# Patient Record
Sex: MALE | Race: WHITE | Employment: FULL TIME | URBAN - METROPOLITAN AREA
[De-identification: names, ages, dates, MRNs, and addresses within clinical notes are randomized per-mention and may not be internally consistent; named-entity substitution may affect disease eponyms.]

---

## 2017-04-02 DIAGNOSIS — E11.42 TYPE 2 DIABETES MELLITUS WITH DIABETIC POLYNEUROPATHY (HCC): ICD-10-CM

## 2017-04-03 RX ORDER — SAXAGLIPTIN AND METFORMIN HYDROCHLORIDE 2.5; 1 MG/1; MG/1
TABLET, FILM COATED, EXTENDED RELEASE ORAL
Qty: 60 TABLET | Refills: 0 | Status: SHIPPED | OUTPATIENT
Start: 2017-04-03 | End: 2017-04-17 | Stop reason: SDUPTHER

## 2017-04-03 RX ORDER — EMPAGLIFLOZIN 10 MG/1
TABLET, FILM COATED ORAL
Qty: 30 TABLET | Refills: 0 | Status: SHIPPED | OUTPATIENT
Start: 2017-04-03 | End: 2017-04-17 | Stop reason: SDUPTHER

## 2017-04-03 RX ORDER — PAROXETINE 30 MG/1
TABLET, FILM COATED ORAL
Qty: 60 TABLET | Refills: 0 | Status: SHIPPED | OUTPATIENT
Start: 2017-04-03 | End: 2017-04-17 | Stop reason: SDUPTHER

## 2017-04-03 RX ORDER — FENOFIBRATE 160 MG/1
TABLET ORAL
Qty: 30 TABLET | Refills: 0 | Status: SHIPPED | OUTPATIENT
Start: 2017-04-03 | End: 2017-04-17 | Stop reason: SDUPTHER

## 2017-04-03 RX ORDER — VALSARTAN 160 MG/1
TABLET ORAL
Qty: 30 TABLET | Refills: 0 | Status: SHIPPED | OUTPATIENT
Start: 2017-04-03 | End: 2017-04-17 | Stop reason: SDUPTHER

## 2017-04-03 RX ORDER — INSULIN GLARGINE 100 [IU]/ML
INJECTION, SOLUTION SUBCUTANEOUS
Qty: 30 ML | Refills: 0 | Status: SHIPPED | OUTPATIENT
Start: 2017-04-03 | End: 2017-04-17 | Stop reason: SDUPTHER

## 2017-04-03 RX ORDER — PROPRANOLOL HCL 60 MG
CAPSULE, EXTENDED RELEASE 24HR ORAL
Qty: 30 CAPSULE | Refills: 0 | Status: SHIPPED | OUTPATIENT
Start: 2017-04-03 | End: 2017-04-17 | Stop reason: SDUPTHER

## 2017-04-17 ENCOUNTER — OFFICE VISIT (OUTPATIENT)
Dept: FAMILY MEDICINE CLINIC | Age: 57
End: 2017-04-17

## 2017-04-17 VITALS
BODY MASS INDEX: 28.84 KG/M2 | HEIGHT: 71 IN | HEART RATE: 77 BPM | WEIGHT: 206 LBS | SYSTOLIC BLOOD PRESSURE: 122 MMHG | OXYGEN SATURATION: 98 % | DIASTOLIC BLOOD PRESSURE: 76 MMHG | RESPIRATION RATE: 14 BRPM

## 2017-04-17 DIAGNOSIS — I10 ESSENTIAL HYPERTENSION: ICD-10-CM

## 2017-04-17 DIAGNOSIS — E55.9 VITAMIN D DEFICIENCY: ICD-10-CM

## 2017-04-17 DIAGNOSIS — E78.00 HYPERCHOLESTEREMIA: ICD-10-CM

## 2017-04-17 DIAGNOSIS — E11.40 TYPE 2 DIABETES MELLITUS WITH DIABETIC NEUROPATHY, UNSPECIFIED LONG TERM INSULIN USE STATUS: Primary | ICD-10-CM

## 2017-04-17 DIAGNOSIS — Z79.4 TYPE 2 DIABETES MELLITUS WITH DIABETIC POLYNEUROPATHY, WITH LONG-TERM CURRENT USE OF INSULIN (HCC): ICD-10-CM

## 2017-04-17 DIAGNOSIS — F41.9 ANXIETY: ICD-10-CM

## 2017-04-17 DIAGNOSIS — E11.42 TYPE 2 DIABETES MELLITUS WITH DIABETIC POLYNEUROPATHY, WITH LONG-TERM CURRENT USE OF INSULIN (HCC): ICD-10-CM

## 2017-04-17 LAB
A/G RATIO: 1.9 (ref 1.1–2.2)
ALBUMIN SERPL-MCNC: 4.1 G/DL (ref 3.4–5)
ALP BLD-CCNC: 89 U/L (ref 40–129)
ALT SERPL-CCNC: 33 U/L (ref 10–40)
ANION GAP SERPL CALCULATED.3IONS-SCNC: 17 MMOL/L (ref 3–16)
AST SERPL-CCNC: 21 U/L (ref 15–37)
BILIRUB SERPL-MCNC: 0.4 MG/DL (ref 0–1)
BUN BLDV-MCNC: 14 MG/DL (ref 7–20)
CALCIUM SERPL-MCNC: 9.2 MG/DL (ref 8.3–10.6)
CHLORIDE BLD-SCNC: 98 MMOL/L (ref 99–110)
CHOLESTEROL, TOTAL: 171 MG/DL (ref 0–199)
CO2: 24 MMOL/L (ref 21–32)
CREAT SERPL-MCNC: 0.9 MG/DL (ref 0.9–1.3)
CREATININE URINE: 87.8 MG/DL (ref 39–259)
GFR AFRICAN AMERICAN: >60
GFR NON-AFRICAN AMERICAN: >60
GLOBULIN: 2.2 G/DL
GLUCOSE BLD-MCNC: 184 MG/DL (ref 70–99)
HBA1C MFR BLD: 7.5 %
HDLC SERPL-MCNC: 24 MG/DL (ref 40–60)
LDL CHOLESTEROL CALCULATED: ABNORMAL MG/DL
LDL CHOLESTEROL DIRECT: 79 MG/DL
MICROALBUMIN UR-MCNC: <1.2 MG/DL
MICROALBUMIN/CREAT UR-RTO: NORMAL MG/G (ref 0–30)
POTASSIUM SERPL-SCNC: 4.6 MMOL/L (ref 3.5–5.1)
SODIUM BLD-SCNC: 139 MMOL/L (ref 136–145)
TOTAL PROTEIN: 6.3 G/DL (ref 6.4–8.2)
TRIGL SERPL-MCNC: 496 MG/DL (ref 0–150)
VITAMIN D 25-HYDROXY: 19.8 NG/ML
VLDLC SERPL CALC-MCNC: ABNORMAL MG/DL

## 2017-04-17 PROCEDURE — 99214 OFFICE O/P EST MOD 30 MIN: CPT | Performed by: FAMILY MEDICINE

## 2017-04-17 PROCEDURE — 36415 COLL VENOUS BLD VENIPUNCTURE: CPT | Performed by: FAMILY MEDICINE

## 2017-04-17 PROCEDURE — 83036 HEMOGLOBIN GLYCOSYLATED A1C: CPT | Performed by: FAMILY MEDICINE

## 2017-04-17 RX ORDER — PROPRANOLOL HCL 60 MG
CAPSULE, EXTENDED RELEASE 24HR ORAL
Qty: 30 CAPSULE | Refills: 11 | Status: SHIPPED | OUTPATIENT
Start: 2017-04-17 | End: 2017-07-17 | Stop reason: SDUPTHER

## 2017-04-17 RX ORDER — VALSARTAN 160 MG/1
TABLET ORAL
Qty: 30 TABLET | Refills: 11 | Status: SHIPPED | OUTPATIENT
Start: 2017-04-17 | End: 2017-07-17 | Stop reason: SDUPTHER

## 2017-04-17 RX ORDER — FENOFIBRATE 160 MG/1
TABLET ORAL
Qty: 30 TABLET | Refills: 11 | Status: SHIPPED | OUTPATIENT
Start: 2017-04-17 | End: 2017-07-17 | Stop reason: SDUPTHER

## 2017-04-17 RX ORDER — PAROXETINE 30 MG/1
TABLET, FILM COATED ORAL
Qty: 60 TABLET | Refills: 11 | Status: SHIPPED | OUTPATIENT
Start: 2017-04-17 | End: 2017-07-17 | Stop reason: SDUPTHER

## 2017-07-17 ENCOUNTER — OFFICE VISIT (OUTPATIENT)
Dept: FAMILY MEDICINE CLINIC | Age: 57
End: 2017-07-17

## 2017-07-17 VITALS
RESPIRATION RATE: 14 BRPM | DIASTOLIC BLOOD PRESSURE: 72 MMHG | BODY MASS INDEX: 29.62 KG/M2 | SYSTOLIC BLOOD PRESSURE: 112 MMHG | WEIGHT: 211.6 LBS | HEIGHT: 71 IN

## 2017-07-17 DIAGNOSIS — Z79.4 TYPE 2 DIABETES MELLITUS WITH DIABETIC POLYNEUROPATHY, WITH LONG-TERM CURRENT USE OF INSULIN (HCC): ICD-10-CM

## 2017-07-17 DIAGNOSIS — E78.6 LOW HDL (UNDER 40): Primary | ICD-10-CM

## 2017-07-17 DIAGNOSIS — E11.42 TYPE 2 DIABETES MELLITUS WITH DIABETIC POLYNEUROPATHY, WITH LONG-TERM CURRENT USE OF INSULIN (HCC): ICD-10-CM

## 2017-07-17 DIAGNOSIS — E78.2 MIXED HYPERLIPIDEMIA: ICD-10-CM

## 2017-07-17 DIAGNOSIS — F32.5 MAJOR DEPRESSION IN REMISSION (HCC): ICD-10-CM

## 2017-07-17 DIAGNOSIS — E11.42 DIABETIC POLYNEUROPATHY ASSOCIATED WITH TYPE 2 DIABETES MELLITUS (HCC): ICD-10-CM

## 2017-07-17 DIAGNOSIS — E55.9 VITAMIN D DEFICIENCY: ICD-10-CM

## 2017-07-17 DIAGNOSIS — K21.9 GASTROESOPHAGEAL REFLUX DISEASE, ESOPHAGITIS PRESENCE NOT SPECIFIED: ICD-10-CM

## 2017-07-17 DIAGNOSIS — I10 ESSENTIAL HYPERTENSION: ICD-10-CM

## 2017-07-17 DIAGNOSIS — F41.1 GENERALIZED ANXIETY DISORDER: ICD-10-CM

## 2017-07-17 LAB — HBA1C MFR BLD: 7.4 %

## 2017-07-17 PROCEDURE — 83036 HEMOGLOBIN GLYCOSYLATED A1C: CPT | Performed by: FAMILY MEDICINE

## 2017-07-17 PROCEDURE — 99214 OFFICE O/P EST MOD 30 MIN: CPT | Performed by: FAMILY MEDICINE

## 2017-07-17 RX ORDER — PROPRANOLOL HCL 60 MG
CAPSULE, EXTENDED RELEASE 24HR ORAL
Qty: 30 CAPSULE | Refills: 11 | Status: SHIPPED | OUTPATIENT
Start: 2017-07-17 | End: 2018-01-04 | Stop reason: SDUPTHER

## 2017-07-17 RX ORDER — VALSARTAN 160 MG/1
TABLET ORAL
Qty: 30 TABLET | Refills: 11 | Status: SHIPPED | OUTPATIENT
Start: 2017-07-17 | End: 2018-01-04 | Stop reason: SDUPTHER

## 2017-07-17 RX ORDER — FENOFIBRATE 160 MG/1
TABLET ORAL
Qty: 30 TABLET | Refills: 11 | Status: SHIPPED | OUTPATIENT
Start: 2017-07-17 | End: 2018-04-03 | Stop reason: SDUPTHER

## 2017-07-17 RX ORDER — PAROXETINE 30 MG/1
TABLET, FILM COATED ORAL
Qty: 60 TABLET | Refills: 11 | Status: SHIPPED | OUTPATIENT
Start: 2017-07-17 | End: 2018-04-03 | Stop reason: SDUPTHER

## 2017-07-17 ASSESSMENT — PATIENT HEALTH QUESTIONNAIRE - PHQ9
2. FEELING DOWN, DEPRESSED OR HOPELESS: 0
1. LITTLE INTEREST OR PLEASURE IN DOING THINGS: 0
SUM OF ALL RESPONSES TO PHQ9 QUESTIONS 1 & 2: 0
SUM OF ALL RESPONSES TO PHQ QUESTIONS 1-9: 0

## 2017-09-12 RX ORDER — PEN NEEDLE, DIABETIC 31 GX5/16"
NEEDLE, DISPOSABLE MISCELLANEOUS
Qty: 100 EACH | Refills: 3 | Status: SHIPPED | OUTPATIENT
Start: 2017-09-12 | End: 2017-09-22 | Stop reason: ALTCHOICE

## 2017-09-22 ENCOUNTER — OFFICE VISIT (OUTPATIENT)
Dept: FAMILY MEDICINE CLINIC | Age: 57
End: 2017-09-22

## 2017-09-22 VITALS
OXYGEN SATURATION: 99 % | DIASTOLIC BLOOD PRESSURE: 78 MMHG | WEIGHT: 196 LBS | HEART RATE: 98 BPM | RESPIRATION RATE: 14 BRPM | SYSTOLIC BLOOD PRESSURE: 120 MMHG | BODY MASS INDEX: 27.44 KG/M2 | HEIGHT: 71 IN

## 2017-09-22 DIAGNOSIS — R20.0 NUMBNESS AND TINGLING IN BOTH HANDS: ICD-10-CM

## 2017-09-22 DIAGNOSIS — R20.2 NUMBNESS AND TINGLING IN BOTH HANDS: ICD-10-CM

## 2017-09-22 DIAGNOSIS — R19.7 DIARRHEA, UNSPECIFIED TYPE: Primary | ICD-10-CM

## 2017-09-22 DIAGNOSIS — R11.2 NAUSEA AND VOMITING, INTRACTABILITY OF VOMITING NOT SPECIFIED, UNSPECIFIED VOMITING TYPE: ICD-10-CM

## 2017-09-22 DIAGNOSIS — R19.5 STOOL COLOR ABNORMAL: ICD-10-CM

## 2017-09-22 LAB
A/G RATIO: 1.9 (ref 1.1–2.2)
ALBUMIN SERPL-MCNC: 4.5 G/DL (ref 3.4–5)
ALP BLD-CCNC: 82 U/L (ref 40–129)
ALT SERPL-CCNC: 35 U/L (ref 10–40)
ANION GAP SERPL CALCULATED.3IONS-SCNC: 18 MMOL/L (ref 3–16)
AST SERPL-CCNC: 24 U/L (ref 15–37)
BASOPHILS ABSOLUTE: 0.1 K/UL (ref 0–0.2)
BASOPHILS RELATIVE PERCENT: 0.6 %
BILIRUB SERPL-MCNC: 0.4 MG/DL (ref 0–1)
BUN BLDV-MCNC: 14 MG/DL (ref 7–20)
CALCIUM SERPL-MCNC: 9.5 MG/DL (ref 8.3–10.6)
CHLORIDE BLD-SCNC: 101 MMOL/L (ref 99–110)
CO2: 21 MMOL/L (ref 21–32)
CREAT SERPL-MCNC: 0.8 MG/DL (ref 0.9–1.3)
EOSINOPHILS ABSOLUTE: 0.2 K/UL (ref 0–0.6)
EOSINOPHILS RELATIVE PERCENT: 2.1 %
GFR AFRICAN AMERICAN: >60
GFR NON-AFRICAN AMERICAN: >60
GLOBULIN: 2.4 G/DL
GLUCOSE BLD-MCNC: 166 MG/DL (ref 70–99)
HCT VFR BLD CALC: 54 % (ref 40.5–52.5)
HEMOGLOBIN: 18.2 G/DL (ref 13.5–17.5)
LIPASE: 30 U/L (ref 13–60)
LYMPHOCYTES ABSOLUTE: 2.4 K/UL (ref 1–5.1)
LYMPHOCYTES RELATIVE PERCENT: 25.7 %
MCH RBC QN AUTO: 28.2 PG (ref 26–34)
MCHC RBC AUTO-ENTMCNC: 33.7 G/DL (ref 31–36)
MCV RBC AUTO: 83.5 FL (ref 80–100)
MONOCYTES ABSOLUTE: 0.9 K/UL (ref 0–1.3)
MONOCYTES RELATIVE PERCENT: 9.5 %
NEUTROPHILS ABSOLUTE: 5.8 K/UL (ref 1.7–7.7)
NEUTROPHILS RELATIVE PERCENT: 62.1 %
PDW BLD-RTO: 13.8 % (ref 12.4–15.4)
PLATELET # BLD: 211 K/UL (ref 135–450)
PMV BLD AUTO: 9.5 FL (ref 5–10.5)
POTASSIUM SERPL-SCNC: 3.9 MMOL/L (ref 3.5–5.1)
RBC # BLD: 6.47 M/UL (ref 4.2–5.9)
SODIUM BLD-SCNC: 140 MMOL/L (ref 136–145)
TOTAL PROTEIN: 6.9 G/DL (ref 6.4–8.2)
WBC # BLD: 9.3 K/UL (ref 4–11)

## 2017-09-22 PROCEDURE — 99213 OFFICE O/P EST LOW 20 MIN: CPT | Performed by: FAMILY MEDICINE

## 2017-09-22 PROCEDURE — 36415 COLL VENOUS BLD VENIPUNCTURE: CPT | Performed by: FAMILY MEDICINE

## 2017-09-22 PROCEDURE — 96372 THER/PROPH/DIAG INJ SC/IM: CPT | Performed by: FAMILY MEDICINE

## 2017-09-22 RX ORDER — ONDANSETRON 4 MG/1
4 TABLET, ORALLY DISINTEGRATING ORAL EVERY 8 HOURS PRN
Qty: 12 TABLET | Refills: 1 | Status: SHIPPED | OUTPATIENT
Start: 2017-09-22 | End: 2018-08-02 | Stop reason: ALTCHOICE

## 2017-09-22 RX ORDER — PROMETHAZINE HYDROCHLORIDE 25 MG/ML
25 INJECTION, SOLUTION INTRAMUSCULAR; INTRAVENOUS ONCE
Status: COMPLETED | OUTPATIENT
Start: 2017-09-22 | End: 2017-09-22

## 2017-09-22 RX ORDER — DICYCLOMINE HCL 20 MG
20 TABLET ORAL EVERY 6 HOURS
Qty: 30 TABLET | Refills: 1 | Status: SHIPPED | OUTPATIENT
Start: 2017-09-22 | End: 2018-08-02 | Stop reason: ALTCHOICE

## 2017-09-22 RX ADMIN — PROMETHAZINE HYDROCHLORIDE 25 MG: 25 INJECTION, SOLUTION INTRAMUSCULAR; INTRAVENOUS at 17:01

## 2017-09-28 DIAGNOSIS — D75.1 POLYCYTHEMIA: Primary | ICD-10-CM

## 2017-10-27 ENCOUNTER — OFFICE VISIT (OUTPATIENT)
Dept: FAMILY MEDICINE CLINIC | Age: 57
End: 2017-10-27

## 2017-10-27 VITALS
WEIGHT: 200.4 LBS | DIASTOLIC BLOOD PRESSURE: 76 MMHG | BODY MASS INDEX: 28.06 KG/M2 | HEIGHT: 71 IN | SYSTOLIC BLOOD PRESSURE: 122 MMHG | HEART RATE: 64 BPM | RESPIRATION RATE: 12 BRPM

## 2017-10-27 DIAGNOSIS — K21.9 GASTROESOPHAGEAL REFLUX DISEASE, ESOPHAGITIS PRESENCE NOT SPECIFIED: ICD-10-CM

## 2017-10-27 DIAGNOSIS — I10 ESSENTIAL HYPERTENSION, BENIGN: ICD-10-CM

## 2017-10-27 DIAGNOSIS — E78.2 MIXED HYPERLIPIDEMIA: ICD-10-CM

## 2017-10-27 DIAGNOSIS — E11.40 TYPE 2 DIABETES MELLITUS WITH DIABETIC NEUROPATHY, UNSPECIFIED LONG TERM INSULIN USE STATUS: Primary | ICD-10-CM

## 2017-10-27 DIAGNOSIS — R11.2 NAUSEA VOMITING AND DIARRHEA: ICD-10-CM

## 2017-10-27 DIAGNOSIS — Z23 NEED FOR INFLUENZA VACCINATION: ICD-10-CM

## 2017-10-27 DIAGNOSIS — R19.7 NAUSEA VOMITING AND DIARRHEA: ICD-10-CM

## 2017-10-27 DIAGNOSIS — E11.42 DIABETIC POLYNEUROPATHY ASSOCIATED WITH TYPE 2 DIABETES MELLITUS (HCC): ICD-10-CM

## 2017-10-27 LAB — HBA1C MFR BLD: 6.3 %

## 2017-10-27 PROCEDURE — 90471 IMMUNIZATION ADMIN: CPT | Performed by: FAMILY MEDICINE

## 2017-10-27 PROCEDURE — 83036 HEMOGLOBIN GLYCOSYLATED A1C: CPT | Performed by: FAMILY MEDICINE

## 2017-10-27 PROCEDURE — 99214 OFFICE O/P EST MOD 30 MIN: CPT | Performed by: FAMILY MEDICINE

## 2017-10-27 PROCEDURE — 90630 INFLUENZA, QUADV, 18-64 YRS, ID, PF, MICRO INJ, 0.1ML (FLUZONE QUADV, PF): CPT | Performed by: FAMILY MEDICINE

## 2017-10-27 NOTE — PROGRESS NOTES
Subjective:      Patient ID: Rosangela Jain is a 56 y.o. male. HPI  Chief Complaint   Patient presents with    Diabetes     DM ROUTINE FOLLOW UP AIC TODAY GOAL AND [facility] UTD      BP Readings from Last 3 Encounters:   10/27/17 122/76   09/22/17 120/78   07/17/17 112/72     Pulse Readings from Last 3 Encounters:   10/27/17 64   09/22/17 98   04/17/17 77     Wt Readings from Last 3 Encounters:   10/27/17 200 lb 6.4 oz (90.9 kg)   09/22/17 196 lb (88.9 kg)   07/17/17 211 lb 9.6 oz (96 kg)     No further n/v/d issues since last month - gets periodic episodes of gi distress - been moving ok recently  Doing much better overall  a1c 7.4% 3 months ago. Changed few things in diet - rajwinder pop intake cut back  Was up to 4-5 pops/ day  Has habit of eating in mid of night. Am bs running lower  Drinks flavored water - not water fan.  utd on eye exam  Review of Systems  Hoping to lose weight  Neuropathy sx seem better w/ lower bs  Less tingling/ numbness  No cp/palp/sob  Objective:   Physical Exam   Constitutional: He appears well-developed. No distress. HENT:   Mouth/Throat: Oropharynx is clear and moist.   Eyes: Conjunctivae are normal. No scleral icterus. Cardiovascular: Normal rate, regular rhythm, normal heart sounds and intact distal pulses. Exam reveals no gallop. No murmur heard. Pulmonary/Chest: Effort normal and breath sounds normal. No respiratory distress. He has no wheezes. He has no rhonchi. He has no rales. Abdominal: Soft. Bowel sounds are normal. He exhibits no distension. There is no tenderness. Musculoskeletal: He exhibits no edema. Neurological: He is alert. Skin: Skin is intact. No rash noted. No erythema. Psychiatric: He has a normal mood and affect. Assessment:      1. Type 2 diabetes mellitus with diabetic neuropathy, unspecified long term insulin use status ([facility] [address]     2. Need for influenza vaccination     3. Nausea vomiting and diarrhea     4. Mixed hyperlipidemia     5.  Essential

## 2018-01-04 ENCOUNTER — TELEPHONE (OUTPATIENT)
Dept: FAMILY MEDICINE CLINIC | Age: 58
End: 2018-01-04

## 2018-01-04 RX ORDER — VALSARTAN 160 MG/1
TABLET ORAL
Qty: 30 TABLET | Refills: 11 | Status: SHIPPED | OUTPATIENT
Start: 2018-01-04 | End: 2018-02-19 | Stop reason: SDUPTHER

## 2018-01-04 RX ORDER — PROPRANOLOL HCL 60 MG
CAPSULE, EXTENDED RELEASE 24HR ORAL
Qty: 30 CAPSULE | Refills: 11 | Status: SHIPPED | OUTPATIENT
Start: 2018-01-04 | End: 2018-02-19 | Stop reason: SDUPTHER

## 2018-01-04 NOTE — TELEPHONE ENCOUNTER
PT NEEDING A REFILL ON THESE SCRIPTS --  HE WOULD LIKE THEM WRITTEN OUT - SO HE CAN CHECK PRICES HE HAS LOST HIS INSURANCE      valsartan (DIOVAN) 160 MG tablet       propranolol (INDERAL LA) 60 MG extended release capsule       PT @  [phone]      ALSO WANTING TO KNOW IF THE JARDIANCE COULD BE CHANGED TO SOMETHING CHEAPER    ALSO ANY WAY TO CHANGE HIS BP MEDS. TO SOMETHING CHEAPER --       HE HAS TRIED THE GOOD RX CARD - NOT MUCH HELP.

## 2018-01-10 ENCOUNTER — TELEPHONE (OUTPATIENT)
Dept: FAMILY MEDICINE CLINIC | Age: 58
End: 2018-01-10

## 2018-01-10 NOTE — TELEPHONE ENCOUNTER
PT WOULD LIKE THE METFORMIN SENT TO [facility] [id number] - [place], [address] [facility] [phone] - f [phone]

## 2018-01-10 NOTE — TELEPHONE ENCOUNTER
PATIENT CALLED BACK AND SPOKE TO LAY IN THE FRONT OFFICE AND WAS ADVISED ON DR. Roberto Osgood NOTE.  SC

## 2018-02-19 RX ORDER — PROPRANOLOL HCL 60 MG
CAPSULE, EXTENDED RELEASE 24HR ORAL
Qty: 90 CAPSULE | Refills: 3 | Status: SHIPPED | OUTPATIENT
Start: 2018-02-19 | End: 2019-04-03 | Stop reason: SDUPTHER

## 2018-02-19 RX ORDER — VALSARTAN 160 MG/1
TABLET ORAL
Qty: 90 TABLET | Refills: 2 | Status: SHIPPED | OUTPATIENT
Start: 2018-02-19 | End: 2018-08-16

## 2018-02-19 NOTE — TELEPHONE ENCOUNTER
Patient now has to have his prescriptions filled through [facility] no and should be 90 day supply

## 2018-04-02 DIAGNOSIS — F41.1 GENERALIZED ANXIETY DISORDER: Primary | ICD-10-CM

## 2018-04-02 DIAGNOSIS — E11.9 TYPE 2 DIABETES MELLITUS WITHOUT COMPLICATION, WITHOUT LONG-TERM CURRENT USE OF INSULIN (HCC): ICD-10-CM

## 2018-04-02 DIAGNOSIS — E78.00 PURE HYPERCHOLESTEROLEMIA: ICD-10-CM

## 2018-04-03 RX ORDER — FENOFIBRATE 160 MG/1
TABLET ORAL
Qty: 90 TABLET | Refills: 0 | Status: SHIPPED | OUTPATIENT
Start: 2018-04-03 | End: 2018-08-02 | Stop reason: SDUPTHER

## 2018-04-03 RX ORDER — PAROXETINE 30 MG/1
TABLET, FILM COATED ORAL
Qty: 180 TABLET | Refills: 0 | Status: SHIPPED | OUTPATIENT
Start: 2018-04-03 | End: 2018-08-02 | Stop reason: SDUPTHER

## 2018-04-05 ENCOUNTER — TELEPHONE (OUTPATIENT)
Dept: FAMILY MEDICINE CLINIC | Age: 58
End: 2018-04-05

## 2018-05-07 ENCOUNTER — OFFICE VISIT (OUTPATIENT)
Dept: FAMILY MEDICINE CLINIC | Age: 58
End: 2018-05-07

## 2018-05-07 VITALS
HEART RATE: 82 BPM | WEIGHT: 210 LBS | SYSTOLIC BLOOD PRESSURE: 128 MMHG | HEIGHT: 71 IN | RESPIRATION RATE: 16 BRPM | BODY MASS INDEX: 29.4 KG/M2 | DIASTOLIC BLOOD PRESSURE: 78 MMHG

## 2018-05-07 DIAGNOSIS — I10 ESSENTIAL HYPERTENSION, BENIGN: ICD-10-CM

## 2018-05-07 DIAGNOSIS — F41.1 GENERALIZED ANXIETY DISORDER: ICD-10-CM

## 2018-05-07 DIAGNOSIS — E78.2 MIXED HYPERLIPIDEMIA: ICD-10-CM

## 2018-05-07 LAB
CREATININE URINE: 122.6 MG/DL (ref 39–259)
HBA1C MFR BLD: 8.9 %
MICROALBUMIN UR-MCNC: 7.4 MG/DL
MICROALBUMIN/CREAT UR-RTO: 60.4 MG/G (ref 0–30)

## 2018-05-07 PROCEDURE — 99214 OFFICE O/P EST MOD 30 MIN: CPT | Performed by: FAMILY MEDICINE

## 2018-05-07 PROCEDURE — 83036 HEMOGLOBIN GLYCOSYLATED A1C: CPT | Performed by: FAMILY MEDICINE

## 2018-05-07 RX ORDER — OMEPRAZOLE 20 MG/1
20 CAPSULE, DELAYED RELEASE ORAL DAILY
Qty: 90 CAPSULE | Refills: 3 | Status: SHIPPED | OUTPATIENT
Start: 2018-05-07 | End: 2019-04-03 | Stop reason: SDUPTHER

## 2018-08-02 ENCOUNTER — OFFICE VISIT (OUTPATIENT)
Dept: FAMILY MEDICINE CLINIC | Age: 58
End: 2018-08-02

## 2018-08-02 VITALS
DIASTOLIC BLOOD PRESSURE: 84 MMHG | HEIGHT: 71 IN | RESPIRATION RATE: 21 BRPM | HEART RATE: 83 BPM | BODY MASS INDEX: 28.9 KG/M2 | SYSTOLIC BLOOD PRESSURE: 126 MMHG | OXYGEN SATURATION: 97 % | WEIGHT: 206.4 LBS

## 2018-08-02 DIAGNOSIS — K21.9 GASTROESOPHAGEAL REFLUX DISEASE, ESOPHAGITIS PRESENCE NOT SPECIFIED: ICD-10-CM

## 2018-08-02 DIAGNOSIS — E78.00 PURE HYPERCHOLESTEROLEMIA: ICD-10-CM

## 2018-08-02 DIAGNOSIS — F41.1 GENERALIZED ANXIETY DISORDER: ICD-10-CM

## 2018-08-02 DIAGNOSIS — E11.9 TYPE 2 DIABETES MELLITUS WITHOUT COMPLICATION, WITHOUT LONG-TERM CURRENT USE OF INSULIN (HCC): ICD-10-CM

## 2018-08-02 DIAGNOSIS — E11.42 DIABETIC POLYNEUROPATHY ASSOCIATED WITH TYPE 2 DIABETES MELLITUS (HCC): Primary | ICD-10-CM

## 2018-08-02 DIAGNOSIS — Z12.5 PROSTATE CANCER SCREENING: ICD-10-CM

## 2018-08-02 DIAGNOSIS — Z11.59 ENCOUNTER FOR HEPATITIS C SCREENING TEST FOR LOW RISK PATIENT: ICD-10-CM

## 2018-08-02 LAB
A/G RATIO: 2 (ref 1.1–2.2)
ALBUMIN SERPL-MCNC: 4.3 G/DL (ref 3.4–5)
ALP BLD-CCNC: 78 U/L (ref 40–129)
ALT SERPL-CCNC: 44 U/L (ref 10–40)
ANION GAP SERPL CALCULATED.3IONS-SCNC: 13 MMOL/L (ref 3–16)
AST SERPL-CCNC: 28 U/L (ref 15–37)
BILIRUB SERPL-MCNC: <0.2 MG/DL (ref 0–1)
BUN BLDV-MCNC: 11 MG/DL (ref 7–20)
CALCIUM SERPL-MCNC: 9.5 MG/DL (ref 8.3–10.6)
CHLORIDE BLD-SCNC: 101 MMOL/L (ref 99–110)
CHOLESTEROL, TOTAL: 180 MG/DL (ref 0–199)
CO2: 25 MMOL/L (ref 21–32)
CREAT SERPL-MCNC: 0.9 MG/DL (ref 0.9–1.3)
GFR AFRICAN AMERICAN: >60
GFR NON-AFRICAN AMERICAN: >60
GLOBULIN: 2.2 G/DL
GLUCOSE BLD-MCNC: 196 MG/DL (ref 70–99)
HDLC SERPL-MCNC: 32 MG/DL (ref 40–60)
HEPATITIS C ANTIBODY INTERPRETATION: NORMAL
LDL CHOLESTEROL CALCULATED: ABNORMAL MG/DL
LDL CHOLESTEROL DIRECT: 105 MG/DL
POTASSIUM SERPL-SCNC: 5.2 MMOL/L (ref 3.5–5.1)
PROSTATE SPECIFIC ANTIGEN: 0.81 NG/ML (ref 0–4)
SODIUM BLD-SCNC: 139 MMOL/L (ref 136–145)
TOTAL PROTEIN: 6.5 G/DL (ref 6.4–8.2)
TRIGL SERPL-MCNC: 402 MG/DL (ref 0–150)
VLDLC SERPL CALC-MCNC: ABNORMAL MG/DL

## 2018-08-02 PROCEDURE — 99214 OFFICE O/P EST MOD 30 MIN: CPT | Performed by: FAMILY MEDICINE

## 2018-08-02 PROCEDURE — G0444 DEPRESSION SCREEN ANNUAL: HCPCS | Performed by: FAMILY MEDICINE

## 2018-08-02 PROCEDURE — 36415 COLL VENOUS BLD VENIPUNCTURE: CPT | Performed by: FAMILY MEDICINE

## 2018-08-02 RX ORDER — LOSARTAN POTASSIUM 100 MG/1
100 TABLET ORAL DAILY
Qty: 90 TABLET | Refills: 1 | Status: SHIPPED | OUTPATIENT
Start: 2018-08-02 | End: 2019-04-03 | Stop reason: SDUPTHER

## 2018-08-02 RX ORDER — FENOFIBRATE 160 MG/1
TABLET ORAL
Qty: 90 TABLET | Refills: 3 | Status: SHIPPED | OUTPATIENT
Start: 2018-08-02 | End: 2019-04-03

## 2018-08-02 RX ORDER — PAROXETINE 30 MG/1
TABLET, FILM COATED ORAL
Qty: 180 TABLET | Refills: 3 | Status: SHIPPED | OUTPATIENT
Start: 2018-08-02 | End: 2019-04-03 | Stop reason: SDUPTHER

## 2018-08-02 ASSESSMENT — PATIENT HEALTH QUESTIONNAIRE - PHQ9
SUM OF ALL RESPONSES TO PHQ QUESTIONS 1-9: 14
8. MOVING OR SPEAKING SO SLOWLY THAT OTHER PEOPLE COULD HAVE NOTICED. OR THE OPPOSITE, BEING SO FIGETY OR RESTLESS THAT YOU HAVE BEEN MOVING AROUND A LOT MORE THAN USUAL: 0
6. FEELING BAD ABOUT YOURSELF - OR THAT YOU ARE A FAILURE OR HAVE LET YOURSELF OR YOUR FAMILY DOWN: 1
5. POOR APPETITE OR OVEREATING: 2
4. FEELING TIRED OR HAVING LITTLE ENERGY: 2
SUM OF ALL RESPONSES TO PHQ9 QUESTIONS 1 & 2: 5
2. FEELING DOWN, DEPRESSED OR HOPELESS: 2
9. THOUGHTS THAT YOU WOULD BE BETTER OFF DEAD, OR OF HURTING YOURSELF: 0
7. TROUBLE CONCENTRATING ON THINGS, SUCH AS READING THE NEWSPAPER OR WATCHING TELEVISION: 3
1. LITTLE INTEREST OR PLEASURE IN DOING THINGS: 3
3. TROUBLE FALLING OR STAYING ASLEEP: 1
10. IF YOU CHECKED OFF ANY PROBLEMS, HOW DIFFICULT HAVE THESE PROBLEMS MADE IT FOR YOU TO DO YOUR WORK, TAKE CARE OF THINGS AT HOME, OR GET ALONG WITH OTHER PEOPLE: 1

## 2018-08-02 NOTE — PROGRESS NOTES
Subjective:      Patient ID: Maegan Dominguez is a 66 y.o. male. HPI  Chief Complaint   Patient presents with    Diabetes     DM ROUTINE FOLLOW UP AIC TOO SOON TO DO TODAY GOAL TODAY     Hypertension     HTN ROUTINE FOLLOW UP GOAL TODAY PT IS ON VALSARTAN      SUGARS COMING DOWN - LAST A1C 8.9%   HANDS/ FEET FEELING BETTER - WENT THROUGH insurance/ job change and had to come off meds for time. Bumped insulin up to 80 units daily - needs refill end of month  Taking farxiga 10/ janumet 50/1000 xr 2/d  BP Readings from Last 3 Encounters:   08/02/18 126/84   05/07/18 128/78   10/27/17 122/76     Pulse Readings from Last 3 Encounters:   08/02/18 83   05/07/18 82   10/27/17 64     Wt Readings from Last 3 Encounters:   08/02/18 206 lb 6.4 oz (93.6 kg)   05/07/18 210 lb (95.3 kg)   10/27/17 200 lb 6.4 oz (90.9 kg)   sensation in feet better w/ reduction in bs  Vision stable. 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 No swelling  No urinary issues - nocturia/ weak stream    Review of Systems    Objective:   Physical Exam   Constitutional: He appears well-developed. No distress. HENT:   Mouth/Throat: Oropharynx is clear and moist.   Eyes: Conjunctivae are normal. No scleral icterus. Cardiovascular: Normal rate, regular rhythm, normal heart sounds and intact distal pulses. Exam reveals no gallop. No murmur heard. Pulmonary/Chest: Effort normal and breath sounds normal. No respiratory distress. He has no wheezes. He has no rhonchi. He has no rales. Abdominal: Soft. Bowel sounds are normal. He exhibits no distension. There is no tenderness. Musculoskeletal: He exhibits no edema. Neurological: He is alert. Skin: Skin is intact. No rash noted. No erythema. Psychiatric: He has a normal mood and affect. Assessment:       Diagnosis Orders   1.  Diabetic polyneuropathy associated with

## 2018-08-08 RX ORDER — ROSUVASTATIN CALCIUM 10 MG/1
10 TABLET, COATED ORAL NIGHTLY
Qty: 30 TABLET | Refills: 2 | Status: SHIPPED | OUTPATIENT
Start: 2018-08-08 | End: 2019-04-03 | Stop reason: SDUPTHER

## 2018-08-10 ENCOUNTER — NURSE ONLY (OUTPATIENT)
Dept: FAMILY MEDICINE CLINIC | Age: 58
End: 2018-08-10

## 2018-08-10 DIAGNOSIS — E11.42 DIABETIC POLYNEUROPATHY ASSOCIATED WITH TYPE 2 DIABETES MELLITUS (HCC): Primary | ICD-10-CM

## 2018-08-10 LAB — HBA1C MFR BLD: 8.8 %

## 2018-08-10 PROCEDURE — 83036 HEMOGLOBIN GLYCOSYLATED A1C: CPT | Performed by: FAMILY MEDICINE

## 2018-08-13 RX ORDER — PIOGLITAZONEHYDROCHLORIDE 30 MG/1
30 TABLET ORAL DAILY
Qty: 30 TABLET | Refills: 3 | Status: SHIPPED | OUTPATIENT
Start: 2018-08-13 | End: 2019-04-03 | Stop reason: SDUPTHER

## 2018-08-16 ENCOUNTER — TELEPHONE (OUTPATIENT)
Dept: FAMILY MEDICINE CLINIC | Age: 58
End: 2018-08-16

## 2018-08-16 RX ORDER — IRBESARTAN 150 MG/1
150 TABLET ORAL NIGHTLY
Qty: 90 TABLET | Refills: 0 | Status: SHIPPED | OUTPATIENT
Start: 2018-08-16 | End: 2019-04-03 | Stop reason: SDUPTHER

## 2018-08-16 NOTE — TELEPHONE ENCOUNTER
Can try change to jentadueto or kombiglyze but I would suggest checking at [place] and [employer] for coupon as this should keep copay down.

## 2018-10-16 ENCOUNTER — CARE COORDINATION (OUTPATIENT)
Dept: CARE COORDINATION | Age: 58
End: 2018-10-16

## 2018-10-16 NOTE — CARE COORDINATION
RNCC attempted to call patient today to discuss DM. LM on VM with my name and contact information. Writer will follow up with patient in two weeks if no return call received.

## 2018-11-23 ENCOUNTER — IMMUNIZATION (OUTPATIENT)
Dept: FAMILY MEDICINE CLINIC | Age: 58
End: 2018-11-23
Payer: COMMERCIAL

## 2018-11-23 VITALS — BODY MASS INDEX: 28.79 KG/M2 | HEIGHT: 71 IN

## 2018-11-23 DIAGNOSIS — Z23 NEED FOR INFLUENZA VACCINATION: Primary | ICD-10-CM

## 2018-11-23 PROCEDURE — 90686 IIV4 VACC NO PRSV 0.5 ML IM: CPT | Performed by: NURSE PRACTITIONER

## 2018-11-23 PROCEDURE — 90471 IMMUNIZATION ADMIN: CPT | Performed by: NURSE PRACTITIONER

## 2018-12-07 ENCOUNTER — CARE COORDINATION (OUTPATIENT)
Dept: CARE COORDINATION | Age: 58
End: 2018-12-07

## 2018-12-28 ENCOUNTER — CARE COORDINATION (OUTPATIENT)
Dept: CARE COORDINATION | Age: 58
End: 2018-12-28

## 2019-01-23 ENCOUNTER — CARE COORDINATION (OUTPATIENT)
Dept: CARE COORDINATION | Age: 59
End: 2019-01-23

## 2019-02-04 ENCOUNTER — CARE COORDINATION (OUTPATIENT)
Dept: CARE COORDINATION | Age: 59
End: 2019-02-04

## 2019-04-03 ENCOUNTER — OFFICE VISIT (OUTPATIENT)
Dept: FAMILY MEDICINE CLINIC | Age: 59
End: 2019-04-03
Payer: COMMERCIAL

## 2019-04-03 VITALS
DIASTOLIC BLOOD PRESSURE: 78 MMHG | HEIGHT: 71 IN | RESPIRATION RATE: 12 BRPM | WEIGHT: 189 LBS | HEART RATE: 80 BPM | SYSTOLIC BLOOD PRESSURE: 122 MMHG | BODY MASS INDEX: 26.46 KG/M2

## 2019-04-03 DIAGNOSIS — F32.5 MAJOR DEPRESSION IN REMISSION (HCC): ICD-10-CM

## 2019-04-03 DIAGNOSIS — F41.1 GENERALIZED ANXIETY DISORDER: ICD-10-CM

## 2019-04-03 DIAGNOSIS — E55.9 VITAMIN D DEFICIENCY: ICD-10-CM

## 2019-04-03 DIAGNOSIS — F41.1 ANXIETY STATE: Chronic | ICD-10-CM

## 2019-04-03 DIAGNOSIS — M54.9 UPPER BACK PAIN: ICD-10-CM

## 2019-04-03 DIAGNOSIS — E78.5 HYPERLIPIDEMIA, UNSPECIFIED HYPERLIPIDEMIA TYPE: Chronic | ICD-10-CM

## 2019-04-03 DIAGNOSIS — M79.675 PAIN OF LEFT GREAT TOE: ICD-10-CM

## 2019-04-03 DIAGNOSIS — E11.9 TYPE 2 DIABETES MELLITUS WITHOUT COMPLICATION, WITHOUT LONG-TERM CURRENT USE OF INSULIN (HCC): ICD-10-CM

## 2019-04-03 DIAGNOSIS — I10 ESSENTIAL HYPERTENSION, BENIGN: ICD-10-CM

## 2019-04-03 DIAGNOSIS — K62.5 RECTAL BLEED: ICD-10-CM

## 2019-04-03 DIAGNOSIS — R63.4 WEIGHT LOSS: ICD-10-CM

## 2019-04-03 DIAGNOSIS — E11.9 DIABETES MELLITUS TYPE 2 IN NONOBESE (HCC): Primary | ICD-10-CM

## 2019-04-03 DIAGNOSIS — R68.89 COLD INTOLERANCE: ICD-10-CM

## 2019-04-03 DIAGNOSIS — K21.9 GASTROESOPHAGEAL REFLUX DISEASE WITHOUT ESOPHAGITIS: ICD-10-CM

## 2019-04-03 DIAGNOSIS — E11.42 DIABETIC POLYNEUROPATHY ASSOCIATED WITH TYPE 2 DIABETES MELLITUS (HCC): ICD-10-CM

## 2019-04-03 DIAGNOSIS — Z12.11 COLON CANCER SCREENING: ICD-10-CM

## 2019-04-03 LAB
A/G RATIO: 2 (ref 1.1–2.2)
ALBUMIN SERPL-MCNC: 4.3 G/DL (ref 3.4–5)
ALP BLD-CCNC: 180 U/L (ref 40–129)
ALT SERPL-CCNC: 27 U/L (ref 10–40)
ANION GAP SERPL CALCULATED.3IONS-SCNC: 16 MMOL/L (ref 3–16)
AST SERPL-CCNC: 17 U/L (ref 15–37)
BASOPHILS ABSOLUTE: 0 K/UL (ref 0–0.2)
BASOPHILS RELATIVE PERCENT: 0.6 %
BILIRUB SERPL-MCNC: 0.3 MG/DL (ref 0–1)
BUN BLDV-MCNC: 13 MG/DL (ref 7–20)
CALCIUM SERPL-MCNC: 9.3 MG/DL (ref 8.3–10.6)
CHLORIDE BLD-SCNC: 91 MMOL/L (ref 99–110)
CO2: 25 MMOL/L (ref 21–32)
CREAT SERPL-MCNC: 0.7 MG/DL (ref 0.9–1.3)
EOSINOPHILS ABSOLUTE: 0.1 K/UL (ref 0–0.6)
EOSINOPHILS RELATIVE PERCENT: 1 %
GFR AFRICAN AMERICAN: >60
GFR NON-AFRICAN AMERICAN: >60
GLOBULIN: 2.2 G/DL
GLUCOSE BLD-MCNC: 492 MG/DL (ref 70–99)
HCT VFR BLD CALC: 44.5 % (ref 40.5–52.5)
HEMOGLOBIN: 15.3 G/DL (ref 13.5–17.5)
LYMPHOCYTES ABSOLUTE: 2 K/UL (ref 1–5.1)
LYMPHOCYTES RELATIVE PERCENT: 37.4 %
MCH RBC QN AUTO: 28.5 PG (ref 26–34)
MCHC RBC AUTO-ENTMCNC: 34.4 G/DL (ref 31–36)
MCV RBC AUTO: 83 FL (ref 80–100)
MONOCYTES ABSOLUTE: 0.5 K/UL (ref 0–1.3)
MONOCYTES RELATIVE PERCENT: 9.5 %
NEUTROPHILS ABSOLUTE: 2.8 K/UL (ref 1.7–7.7)
NEUTROPHILS RELATIVE PERCENT: 51.5 %
PDW BLD-RTO: 13.3 % (ref 12.4–15.4)
PLATELET # BLD: 247 K/UL (ref 135–450)
PMV BLD AUTO: 9.9 FL (ref 5–10.5)
POTASSIUM SERPL-SCNC: 4.5 MMOL/L (ref 3.5–5.1)
RBC # BLD: 5.36 M/UL (ref 4.2–5.9)
SODIUM BLD-SCNC: 132 MMOL/L (ref 136–145)
TOTAL PROTEIN: 6.5 G/DL (ref 6.4–8.2)
TSH REFLEX: 0.82 UIU/ML (ref 0.27–4.2)
URIC ACID, SERUM: 3.8 MG/DL (ref 3.5–7.2)
WBC # BLD: 5.4 K/UL (ref 4–11)

## 2019-04-03 PROCEDURE — 99214 OFFICE O/P EST MOD 30 MIN: CPT | Performed by: FAMILY MEDICINE

## 2019-04-03 RX ORDER — LOSARTAN POTASSIUM 100 MG/1
100 TABLET ORAL DAILY
Qty: 90 TABLET | Refills: 3 | Status: SHIPPED | OUTPATIENT
Start: 2019-04-03 | End: 2019-06-07

## 2019-04-03 RX ORDER — ICOSAPENT ETHYL 1000 MG/1
2 CAPSULE ORAL 2 TIMES DAILY
Qty: 360 CAPSULE | Refills: 3 | Status: SHIPPED | OUTPATIENT
Start: 2019-04-03 | End: 2020-03-25 | Stop reason: SDUPTHER

## 2019-04-03 RX ORDER — PROPRANOLOL HCL 60 MG
CAPSULE, EXTENDED RELEASE 24HR ORAL
Qty: 90 CAPSULE | Refills: 3 | Status: SHIPPED | OUTPATIENT
Start: 2019-04-03 | End: 2020-03-25 | Stop reason: SDUPTHER

## 2019-04-03 RX ORDER — IRBESARTAN 150 MG/1
150 TABLET ORAL NIGHTLY
Qty: 90 TABLET | Refills: 3 | Status: SHIPPED | OUTPATIENT
Start: 2019-04-03 | End: 2020-03-25 | Stop reason: SDUPTHER

## 2019-04-03 RX ORDER — PAROXETINE 30 MG/1
TABLET, FILM COATED ORAL
Qty: 180 TABLET | Refills: 3 | Status: SHIPPED | OUTPATIENT
Start: 2019-04-03 | End: 2020-03-25 | Stop reason: SDUPTHER

## 2019-04-03 RX ORDER — PIOGLITAZONEHYDROCHLORIDE 30 MG/1
30 TABLET ORAL DAILY
Qty: 90 TABLET | Refills: 3 | Status: SHIPPED | OUTPATIENT
Start: 2019-04-03 | End: 2020-03-25 | Stop reason: SDUPTHER

## 2019-04-03 RX ORDER — ROSUVASTATIN CALCIUM 10 MG/1
10 TABLET, COATED ORAL NIGHTLY
Qty: 90 TABLET | Refills: 3 | Status: SHIPPED | OUTPATIENT
Start: 2019-04-03 | End: 2020-03-25 | Stop reason: SDUPTHER

## 2019-04-03 RX ORDER — OMEPRAZOLE 20 MG/1
20 CAPSULE, DELAYED RELEASE ORAL DAILY
Qty: 90 CAPSULE | Refills: 3 | Status: SHIPPED | OUTPATIENT
Start: 2019-04-03 | End: 2020-03-25 | Stop reason: SDUPTHER

## 2019-04-03 NOTE — PROGRESS NOTES
Subjective:      Patient ID: Egdar Winters is a 66 y.o. male. HPI  Chief Complaint   Patient presents with    Diabetes     6 MO DM ROUTINE FOLLOW UP AIC NEEDED DISCUSS EYE AND COLONOSCOPY     Other     HAVING A LOT OF STRANGE FEELINGS IN FEET, IT IS NOT REALLY PAIN BUT THROBS?  HARD TO WALK AT TIMES IF HE DOES NOT HAVE ANYTHING ON HIS FEET IT FEELS STRANGE LIKE SOMETHING HAS TO COVER HIS FEET UP     Other     WANTS TO TALK TO [address] [place] [address] ABOUT ANOTHER COLONOSCOPY HE FEELS HE NEEDS TO REPEAT IT BUT WILL DISCUSS WITH DRBERTRAND      BP Readings from Last 3 Encounters:   04/03/19 122/78   08/02/18 126/84   05/07/18 128/78     Pulse Readings from Last 3 Encounters:   04/03/19 80   08/02/18 83   05/07/18 82     Wt Readings from Last 3 Encounters:   04/03/19 189 lb (85.7 kg)   08/02/18 206 lb 6.4 oz (93.6 kg)   05/07/18 210 lb (95.3 kg)     Current Outpatient Medications   Medication Sig Dispense Refill    irbesartan (AVAPRO) 150 MG tablet Take 1 tablet by mouth nightly 90 tablet 0    pioglitazone (ACTOS) 30 MG tablet Take 1 tablet by mouth daily 30 tablet 3    insulin glargine (LANTUS SOLOSTAR) 100 UNIT/ML injection pen Inject 60-80 Units into the skin nightly 45 pen 5    fenofibrate 160 MG tablet TAKE 1 TABLET BY MOUTH DAILY 90 tablet 3    PARoxetine (PAXIL) 30 MG tablet TAKE 2 TABLETS BY MOUTH DAILY 180 tablet 3    losartan (COZAAR) 100 MG tablet Take 1 tablet by mouth daily 90 tablet 1    omeprazole (PRILOSEC) 20 MG delayed release capsule Take 1 capsule by mouth daily 90 capsule 3    dapagliflozin (FARXIGA) 10 MG tablet Take 1 tablet by mouth every morning 90 tablet 1    propranolol (INDERAL LA) 60 MG extended release capsule TAKE 1 CAPSULE BY MOUTH DAILY 90 capsule 3    ONE TOUCH ULTRA TEST strip USE AS DIRECTED DAILY 50 strip 11    B-D UF III MINI PEN NEEDLES 31G X 5 MM MISC USE AS DIRECTED WITH INSULIN 100 each 0    rosuvastatin (CRESTOR) 10 MG tablet Take 1 tablet by mouth nightly 30 tablet 2    SITagliptin-metFORMIN (JANUMET XR)  MG TB24 per extended release tablet Take 2 tablets by mouth daily 180 tablet 3     No current facility-administered medications for this visit. Lab Results   Component Value Date    LABA1C 8.8 08/10/2018     Lab Results   Component Value Date    .9 04/17/2015   HAS HAD BLOOD IN STOOL OFF AND ON FOR LAST 3 WEEKS - yellowish w/ streaks of blood in stool and blood noted on toilet paper. bm's fairly regular formed but not hard - 1x/d - feels okay - no abd pain/ discomfort - just came out of blue. Left big toe pain at times - uncomfortable walking - wearing slippers around house to provide relief  Some swelling at times - no redness - varies day to day but doesn't go all way away. Had pain right shoulder blade area - ? Related to posture - tried bengay - not going away - 4-5 months  Pain seems to be expanding right upper back -doesn't keep from doing anything but there. occ sharp pain - no bruising - may lean on right - sleeps more on left side - can feel it now. Working multiple jobs. - no lifting anything - nki. No itch/ pain / swelling around anus - occ sore after wiping/ bm. 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 crestor  Hasn't used insulin for couple months - had been taking 60 units. Objective:   Physical Exam   Constitutional: He appears well-developed. No distress. HENT:   Mouth/Throat: Oropharynx is clear and moist.   Eyes: Conjunctivae are normal. No scleral icterus.    Cardiovascular: Normal rate, regular rhythm, normal heart sounds and intact distal pulses. Exam reveals no gallop. No murmur heard. Pulmonary/Chest: Effort normal and breath sounds normal. No respiratory distress. He has no wheezes. He has no rhonchi. He has no rales. Abdominal: Soft. Bowel sounds are normal. He exhibits no distension. There is no tenderness. Genitourinary:   Genitourinary Comments: Anus - no fissure - no significant inflammation or hemorrhoids   Musculoskeletal: He exhibits no edema. Mild ttp right upper parathoracic w/o palp spasm   Neurological: He is alert. 0/5 monofilament  Feet clear - no inflammation toe   Skin: Skin is intact. No rash noted. No erythema. Psychiatric: He has a normal mood and affect. Assessment:       Diagnosis Orders   1. Diabetes mellitus type 2 in nonobese ([facility])  POCT glycosylated hemoglobin (Hb A1C)     DIABETES FOOT EXAM   2. Type 2 diabetes mellitus without complication, without long-term current use of insulin ([facility])  insulin glargine (LANTUS SOLOSTAR) 100 UNIT/ML injection pen   3. Generalized anxiety disorder  PARoxetine (PAXIL) 30 MG tablet   4. Hyperlipidemia, unspecified hyperlipidemia type     5. Essential hypertension, benign     6. Major depression in remission ([address]     7. Vitamin D deficiency     8. Diabetic polyneuropathy associated with type 2 diabetes mellitus ([address]     9. Gastroesophageal reflux disease without esophagitis     10. Anxiety state     11. Rectal bleed     12. Pain of left great toe     13. Upper back pain     14. Colon cancer screening             Plan:      Labs 8/18 reviewed  Diet/ exercise d/w pt  Annual eye exam  Feet care dw/ pt - neuropathy pain likely -check uric acid - possible gout?     Refer for screening colonoscopy - mild rectal bleed likely due to internal hemorrhoids  tx empirically anusol hc but due for screen colonoscopy anyway  Mood fairly good - cont paxil 30/d for now  Check labs today  Compliance stressed - change fenofibrate to vascepa  Restart other meds and recheck 3 months.   Yuli Castillo MD

## 2019-04-04 LAB
ESTIMATED AVERAGE GLUCOSE: 366.6 MG/DL
HBA1C MFR BLD: 14.4 %

## 2019-04-09 ENCOUNTER — TELEPHONE (OUTPATIENT)
Dept: FAMILY MEDICINE CLINIC | Age: 59
End: 2019-04-09

## 2019-04-09 NOTE — TELEPHONE ENCOUNTER
The losartan was changed to irbesartan due to concerns w/ losartan contamination. Can return to losartan if no concerns.

## 2019-05-10 ENCOUNTER — CARE COORDINATION (OUTPATIENT)
Dept: CARE COORDINATION | Age: 59
End: 2019-05-10

## 2019-05-10 NOTE — CARE COORDINATION
[facility] mailed care coordination introduction letter today and will follow up with introduction phone call in 1 week.

## 2019-06-07 ENCOUNTER — OFFICE VISIT (OUTPATIENT)
Dept: FAMILY MEDICINE CLINIC | Age: 59
End: 2019-06-07
Payer: COMMERCIAL

## 2019-06-07 VITALS
HEIGHT: 71 IN | HEART RATE: 100 BPM | SYSTOLIC BLOOD PRESSURE: 120 MMHG | WEIGHT: 193.2 LBS | OXYGEN SATURATION: 96 % | BODY MASS INDEX: 27.05 KG/M2 | TEMPERATURE: 99.5 F | DIASTOLIC BLOOD PRESSURE: 74 MMHG

## 2019-06-07 DIAGNOSIS — J02.9 SORE THROAT: Primary | ICD-10-CM

## 2019-06-07 LAB — S PYO AG THROAT QL: NORMAL

## 2019-06-07 PROCEDURE — 87880 STREP A ASSAY W/OPTIC: CPT | Performed by: REGISTERED NURSE

## 2019-06-07 PROCEDURE — 99213 OFFICE O/P EST LOW 20 MIN: CPT | Performed by: REGISTERED NURSE

## 2019-06-07 RX ORDER — AMOXICILLIN AND CLAVULANATE POTASSIUM 875; 125 MG/1; MG/1
1 TABLET, FILM COATED ORAL 2 TIMES DAILY WITH MEALS
Qty: 20 TABLET | Refills: 0 | Status: SHIPPED | OUTPATIENT
Start: 2019-06-10 | End: 2019-06-20

## 2019-06-07 RX ORDER — METHYLPREDNISOLONE 4 MG/1
4 TABLET ORAL SEE ADMIN INSTRUCTIONS
Qty: 1 KIT | Refills: 0 | Status: SHIPPED | OUTPATIENT
Start: 2019-06-07 | End: 2019-06-13

## 2019-06-07 ASSESSMENT — ENCOUNTER SYMPTOMS
NAUSEA: 1
CHEST TIGHTNESS: 0
TROUBLE SWALLOWING: 0
WHEEZING: 0
ABDOMINAL DISTENTION: 0
COUGH: 1
SINUS PRESSURE: 1
CONSTIPATION: 0
ABDOMINAL PAIN: 0
SHORTNESS OF BREATH: 1
DIARRHEA: 0
SORE THROAT: 1
VOMITING: 0
SINUS PAIN: 1

## 2019-06-07 NOTE — PATIENT INSTRUCTIONS
Mucinex, Flonase, Delsym and Zyrtec  For the first 7-14 days of symptoms follow instructions below, even before being seen in the office or even during treatment with antibiotics, until symptom free. 1. Water: Drink 1 ounce of water for every 2 pounds of body weight for adults, 90 Ounces of water per day. This will loosen mucus in the head and chest & improve the weak feeling of dehydration, allow the body to get germ fighting resources to the infection. Half can be juice or sugar free Crystal Light. Don't count drinks with caffeine or carbonation. Infants can have Pedialyte liquid or freezer pops. Avoid salt if you have high Blood Pressure, swelling in the feet or ankles or have heart problems. 2. Humidity: Humidify the air to 35-50% ( or until the windows fog over slightly).  Summer use of an air conditioner turned down too far and can result in dry air. Can use a humidifier, vaporizer, boil water on the stove or put a coffee can full of water on the heater vents. This will loosen mucus from infections and allergies. 3. Sleep: Get 8-10 hours a night and rest during the evening after work or school. If you have trouble sleeping, adults can take Melatonin 5mg up to 2 tabs at bedtime ( not for children or pregnant women). If Mono is suspected then sleep during 9PM to 9AM time span (if possible.)   4. Cough: Take cough medicines with Guaifenesin ( to loosen chest or head congestion) and Dextromethorphan ( to decrease excess cough). Robitussin D.M. Syrup every 4-6 hrs or Mucinex D. M. pills twice a day. Use the pediatric formulations for children over 6 months making sure they are alcohol & sugar free for children, pregnant women, and diabetics. 5. Pain And Fevers: Take Acetaminophen ( Tylenol) for fevers, aches, and headaches. 2-500 mg every 8 hours for adults. Appropriate doses at bedtime for children may help them sleep better. If pregnant take 1 -500 mg (Tylenol) every 8 hours as needed.  Ibuprofen may be used if not pregnant, but should be given with food to avoid nausea. Avoid Ibuprofen if you have high blood pressure, CHF, or kidney problems. 6.Gargle: (DAY ONE OF SYMPTOMS) Gargle in the back of the throat with the head tilted back and to the sides with a strong mouthwash  ( Listerine or Scope) after meals and at bedtime at least 4 -5 times a day. This helps kill bacteria and viruses in the back of the throat and will shorten the duration and decrease the severity of your symptoms: sore throat, cough, ear popping,/ear pain, and possibly dizziness. 7. Smoking: Avoid smoking or exposure to second hand smoke. 8. Zinc: (DAY ONE OF SYMPTOMS)  Zinc lozenges such as Cold Luis Manuel (available most stores), or Basic ([employer] brand) will help shorten the duration and lessen symptoms such as sore throat, cough, nasal congestion, runny nose, and post nasal drip. Use 1 lozenge every 2-4 hours ( after meals if stomach is sensitive). Children can use 10-15 mg or less 3-4 times a day or Zinc lollypops. In pregnancy limit to 50-60 mg a day for 7 days as prenatals have Zinc also.    With diarrhea use zinc pills 50 mg 1/2 to 1 pill 2x/day. 9. Vitamins: Vitamin C 500 mg with breakfast and dinner. Children and pregnant women should drink citrus juices. This speeds healing and strengthens immune system. 10. Chest Symptoms: Vicks Vapor rub to the chest at bedtime. 11. Decongestants: Avoid all decongestants if you have high blood pressure. Safe to take if you do not have high blood pressure. Try all of the above starting with day 1 of symptoms. If Strep throat symptoms appear call to be seen in the office as soon as possible and don't gargle on that day. Newborns, infants, or anyone with earaches or influenza may need to be seen quickly. Adults with fevers over 103 degrees or shortness of breath should call the office immediately.       Patient Education        Sore Throat: Care Instructions  Your Care Instructions    Infection by bacteria or a virus causes most sore throats. Cigarette smoke, dry air, air pollution, allergies, and yelling can also cause a sore throat. Sore throats can be painful and annoying. Fortunately, most sore throats go away on their own. If you have a bacterial infection, your doctor may prescribe antibiotics. Follow-up care is a key part of your treatment and safety. Be sure to make and go to all appointments, and call your doctor if you are having problems. It's also a good idea to know your test results and keep a list of the medicines you take. How can you care for yourself at home? · If your doctor prescribed antibiotics, take them as directed. Do not stop taking them just because you feel better. You need to take the full course of antibiotics. · Gargle with warm salt water once an hour to help reduce swelling and relieve discomfort. Use 1 teaspoon of salt mixed in 1 cup of warm water. · Take an over-the-counter pain medicine, such as acetaminophen (Tylenol), ibuprofen (Advil, Motrin), or naproxen (Aleve). Read and follow all instructions on the label. · Be careful when taking over-the-counter cold or flu medicines and Tylenol at the same time. Many of these medicines have acetaminophen, which is Tylenol. Read the labels to make sure that you are not taking more than the recommended dose. Too much acetaminophen (Tylenol) can be harmful. · Drink plenty of fluids. Fluids may help soothe an irritated throat. Hot fluids, such as tea or soup, may help decrease throat pain. · Use over-the-counter throat lozenges to soothe pain. Regular cough drops or hard candy may also help. These should not be given to young children because of the risk of choking. · Do not smoke or allow others to smoke around you. If you need help quitting, talk to your doctor about stop-smoking programs and medicines. These can increase your chances of quitting for good. · Use a vaporizer or humidifier to add moisture to your bedroom. Follow the directions for cleaning the machine. When should you call for help? Call your doctor now or seek immediate medical care if:    · You have new or worse trouble swallowing.     · Your sore throat gets much worse on one side.    Watch closely for changes in your health, and be sure to contact your doctor if you do not get better as expected. Where can you learn more? Go to [email or web address] and sign in to your [employer] account. Enter L754 in the [employer] box to learn more about \"Sore Throat: Care Instructions. \"     If you do not have an account, please click on the \"Sign Up Now\" link. Current as of: October 21, 2018  Content Version: 12.0  © 1238-4244 [employer]. Care instructions adapted under license by [facility] ([facility]). If you have questions about a medical condition or this instruction, always ask your healthcare professional. [place] [address] any warranty or liability for your use of this information.

## 2019-06-07 NOTE — PROGRESS NOTES
Range Status    Hemoglobin A1C 04/03/2019 14.4  See comment % Final    Comment: Comment:  Diagnosis of Diabetes: > or = 6.5%  Increased risk of diabetes (Prediabetes): 5.7-6.4%  Glycemic Control: Nonpregnant Adults: <7.0%                    Pregnant: <6.0%        eAG 04/03/2019 366.6  mg/dL Final    Uric Acid, Serum 04/03/2019 3.8  3.5 - 7.2 mg/dL Final    TSH 04/03/2019 0.82  0.27 - 4.20 uIU/mL Final    Sodium 04/03/2019 132* 136 - 145 mmol/L Final    Potassium 04/03/2019 4.5  3.5 - 5.1 mmol/L Final    Comment: Specimen hemolysis has exceeded the interference as defined by Roche. Value may be falsely increased. Suggest recollection if clinically  indicated.  Chloride 04/03/2019 91* 99 - 110 mmol/L Final    CO2 04/03/2019 25  21 - 32 mmol/L Final    Anion Gap 04/03/2019 16  3 - 16 Final    Glucose 04/03/2019 492* 70 - 99 mg/dL Final    Comment: Specimen lipemia has exceeded the interference as defined by Roche. Value  may be falsely increased.  BUN 04/03/2019 13  7 - 20 mg/dL Final    Comment: Specimen lipemia has exceeded the interference as defined by Roche. Value  may be falsely decreased.  CREATININE 04/03/2019 0.7* 0.9 - 1.3 mg/dL Final    Comment: Specimen lipemia has exceeded the interference as defined by Roche. Result may be affected.  GFR Non- 04/03/2019 >60  >60 Final    Comment: >60 mL/min/1.73m2 EGFR, calc. for ages 25 and older using the  MDRD formula (not corrected for weight), is valid for stable  renal function.  GFR  04/03/2019 >60  >60 Final    Comment: Chronic Kidney Disease: less than 60 ml/min/1.73 sq.m. Kidney Failure: less than 15 ml/min/1.73 sq.m. Results valid for patients 18 years and older.       Calcium 04/03/2019 9.3  8.3 - 10.6 mg/dL Final    Total Protein 04/03/2019 6.5  6.4 - 8.2 g/dL Final    Alb 04/03/2019 4.3  3.4 - 5.0 g/dL Final    Comment: Specimen lipemia has exceeded the interference as defined 04/03/2019 0.0  0.0 - 0.2 K/uL Final       No family history on file. Current Outpatient Medications   Medication Sig Dispense Refill    irbesartan (AVAPRO) 150 MG tablet Take 1 tablet by mouth nightly 90 tablet 3    pioglitazone (ACTOS) 30 MG tablet Take 1 tablet by mouth daily 90 tablet 3    insulin glargine (LANTUS SOLOSTAR) 100 UNIT/ML injection pen Inject 60-80 Units into the skin nightly 45 pen 5    PARoxetine (PAXIL) 30 MG tablet TAKE 2 TABLETS BY MOUTH DAILY 180 tablet 3    omeprazole (PRILOSEC) 20 MG delayed release capsule Take 1 capsule by mouth daily 90 capsule 3    dapagliflozin (FARXIGA) 10 MG tablet Take 1 tablet by mouth every morning 90 tablet 3    propranolol (INDERAL LA) 60 MG extended release capsule TAKE 1 CAPSULE BY MOUTH DAILY 90 capsule 3    rosuvastatin (CRESTOR) 10 MG tablet Take 1 tablet by mouth nightly 90 tablet 3    SITagliptin-metFORMIN (JANUMET XR)  MG TB24 per extended release tablet Take 2 tablets by mouth daily 180 tablet 1    Icosapent Ethyl (VASCEPA) 1 g CAPS capsule Take 2 capsules by mouth 2 times daily 360 capsule 3    ONE TOUCH ULTRA TEST strip USE AS DIRECTED DAILY 50 strip 11    B-D UF III MINI PEN NEEDLES 31G X 5 MM MISC USE AS DIRECTED WITH INSULIN 100 each 0     No current facility-administered medications for this visit. Allergies   Allergen Reactions    Lisinopril Other (See Comments)     COUGH       Review of Systems   Constitutional: Positive for chills, fatigue and fever. Negative for diaphoresis. HENT: Positive for postnasal drip, sinus pressure, sinus pain, sneezing and sore throat. Negative for congestion, ear discharge, ear pain, hearing loss, rhinorrhea and trouble swallowing. Respiratory: Positive for cough and shortness of breath. Negative for chest tightness and wheezing. Cardiovascular: Negative for chest pain and palpitations. Gastrointestinal: Positive for nausea.  Negative for abdominal distention, abdominal pain, constipation, diarrhea and vomiting. Neurological: Negative for dizziness, weakness, light-headedness, numbness and headaches. All other systems reviewed and are negative. Vitals:  /74 (Site: Left Upper Arm, Position: Sitting, Cuff Size: Medium Adult)   Pulse 100   Temp 99.5 °F (37.5 °C) (Oral)   Ht 5' 11\" (1.803 m)   Wt 193 lb 3.2 oz (87.6 kg)   SpO2 96%   BMI 26.95 kg/m²     Physical Exam   Constitutional: He is oriented to person, place, and time. He appears well-developed and well-nourished. HENT:   Head: Normocephalic and atraumatic. Right Ear: Tympanic membrane, external ear and ear canal normal.   Left Ear: Tympanic membrane, external ear and ear canal normal.   Nose: Nose normal. Right sinus exhibits no maxillary sinus tenderness and no frontal sinus tenderness. Left sinus exhibits no maxillary sinus tenderness and no frontal sinus tenderness. Mouth/Throat: Uvula is midline, oropharynx is clear and moist and mucous membranes are normal. No oropharyngeal exudate, posterior oropharyngeal edema, posterior oropharyngeal erythema or tonsillar abscesses. No tonsillar exudate. Eyes: Pupils are equal, round, and reactive to light. Conjunctivae and EOM are normal.   Neck: Normal range of motion. Neck supple. No thyromegaly present. Cardiovascular: Normal rate, regular rhythm, normal heart sounds and intact distal pulses. Pulmonary/Chest: Effort normal and breath sounds normal.   Lymphadenopathy:     He has no cervical adenopathy. Neurological: He is alert and oriented to person, place, and time. Skin: Skin is warm and dry. Capillary refill takes less than 2 seconds. Psychiatric: He has a normal mood and affect. His behavior is normal. Judgment and thought content normal.   Nursing note and vitals reviewed. Assessment/Plan     1. Sore throat  Rapid negative. Sent for culture. Treat with steroid taper. Fever today at visit- ibuprofen.  Given paper printed Rx for Augmentin to start on Tuesday 6/11/19 if no improvement of symptoms. Given OTC management education- Mucinex, Flonase, Delsym, push fluids, throat lozenges, and Zyrtec. - POCT rapid strep A  - Throat culture; Future  - methylPREDNISolone (MEDROL, BASIA,) 4 MG tablet; Take 1 tablet by mouth See Admin Instructions for 6 days Take by mouth. Dispense: 1 kit; Refill: 0  - amoxicillin-clavulanate (AUGMENTIN) 875-125 MG per tablet; Take 1 tablet by mouth 2 times daily (with meals) for 10 days  Dispense: 20 tablet; Refill: 0  - Throat culture      Orders Placed This Encounter   Procedures    Throat culture     Standing Status:   Future     Number of Occurrences:   1     Standing Expiration Date:   6/6/2020    POCT rapid strep A       Return if symptoms worsen or fail to improve.     Balbir Harris NP    6/7/2019  4:51 PM

## 2019-06-09 LAB — THROAT CULTURE: NORMAL

## 2019-06-10 ASSESSMENT — ENCOUNTER SYMPTOMS: RHINORRHEA: 0

## 2019-07-05 ENCOUNTER — OFFICE VISIT (OUTPATIENT)
Dept: FAMILY MEDICINE CLINIC | Age: 59
End: 2019-07-05
Payer: COMMERCIAL

## 2019-07-05 VITALS
DIASTOLIC BLOOD PRESSURE: 80 MMHG | OXYGEN SATURATION: 97 % | HEIGHT: 71 IN | RESPIRATION RATE: 20 BRPM | HEART RATE: 88 BPM | SYSTOLIC BLOOD PRESSURE: 120 MMHG | BODY MASS INDEX: 26.54 KG/M2 | WEIGHT: 189.6 LBS

## 2019-07-05 DIAGNOSIS — F41.9 ANXIETY: ICD-10-CM

## 2019-07-05 DIAGNOSIS — I10 ESSENTIAL HYPERTENSION: ICD-10-CM

## 2019-07-05 DIAGNOSIS — E78.00 HYPERCHOLESTEREMIA: ICD-10-CM

## 2019-07-05 DIAGNOSIS — E11.40 TYPE 2 DIABETES MELLITUS WITH DIABETIC NEUROPATHY, UNSPECIFIED WHETHER LONG TERM INSULIN USE (HCC): Primary | ICD-10-CM

## 2019-07-05 DIAGNOSIS — Z20.2 EXPOSURE TO SEXUALLY TRANSMITTED DISEASE (STD): ICD-10-CM

## 2019-07-05 LAB
A/G RATIO: 1.9 (ref 1.1–2.2)
ALBUMIN SERPL-MCNC: 4.4 G/DL (ref 3.4–5)
ALP BLD-CCNC: 121 U/L (ref 40–129)
ALT SERPL-CCNC: 24 U/L (ref 10–40)
ANION GAP SERPL CALCULATED.3IONS-SCNC: 13 MMOL/L (ref 3–16)
AST SERPL-CCNC: 13 U/L (ref 15–37)
BILIRUB SERPL-MCNC: 0.4 MG/DL (ref 0–1)
BUN BLDV-MCNC: 10 MG/DL (ref 7–20)
CALCIUM SERPL-MCNC: 10 MG/DL (ref 8.3–10.6)
CHLORIDE BLD-SCNC: 95 MMOL/L (ref 99–110)
CHOLESTEROL, TOTAL: 236 MG/DL (ref 0–199)
CO2: 26 MMOL/L (ref 21–32)
CREAT SERPL-MCNC: 0.9 MG/DL (ref 0.9–1.3)
CREATININE URINE POCT: NORMAL
GFR AFRICAN AMERICAN: >60
GFR NON-AFRICAN AMERICAN: >60
GLOBULIN: 2.3 G/DL
GLUCOSE BLD-MCNC: 358 MG/DL (ref 70–99)
HBA1C MFR BLD: 12.4 %
HDLC SERPL-MCNC: 30 MG/DL (ref 40–60)
LDL CHOLESTEROL CALCULATED: ABNORMAL MG/DL
LDL CHOLESTEROL DIRECT: 88 MG/DL
MICROALBUMIN/CREAT 24H UR: NORMAL MG/G{CREAT}
MICROALBUMIN/CREAT UR-RTO: NORMAL
POTASSIUM SERPL-SCNC: 5 MMOL/L (ref 3.5–5.1)
SODIUM BLD-SCNC: 134 MMOL/L (ref 136–145)
TOTAL PROTEIN: 6.7 G/DL (ref 6.4–8.2)
TRIGL SERPL-MCNC: 1026 MG/DL (ref 0–150)
VLDLC SERPL CALC-MCNC: ABNORMAL MG/DL

## 2019-07-05 PROCEDURE — 99214 OFFICE O/P EST MOD 30 MIN: CPT | Performed by: FAMILY MEDICINE

## 2019-07-05 PROCEDURE — 82044 UR ALBUMIN SEMIQUANTITATIVE: CPT | Performed by: FAMILY MEDICINE

## 2019-07-05 PROCEDURE — 83036 HEMOGLOBIN GLYCOSYLATED A1C: CPT | Performed by: FAMILY MEDICINE

## 2019-07-05 ASSESSMENT — PATIENT HEALTH QUESTIONNAIRE - PHQ9
SUM OF ALL RESPONSES TO PHQ9 QUESTIONS 1 & 2: 0
SUM OF ALL RESPONSES TO PHQ QUESTIONS 1-9: 0
2. FEELING DOWN, DEPRESSED OR HOPELESS: 0
SUM OF ALL RESPONSES TO PHQ QUESTIONS 1-9: 0
1. LITTLE INTEREST OR PLEASURE IN DOING THINGS: 0

## 2019-07-05 NOTE — PROGRESS NOTES
recently though. Watching diet a little more - goal is to get to 180# - coming down a few pounds  Still drinking regular pop though has cut back on sweets some  Hard to drink water, even flavored - ? Addicted to pop - usually 2-3/ day. Walking 30 min/ day usually  Concern about infidelity couple years ago - would like to be checked  Some vision change lately w/ working on computer  Some sensory change rajwinder left great toe but seems less noticeable recently  Energy okay - not a lot of dry mouth/ thirst.  Had hemorrhoids on recent colonoscopy - limited by inadequate clean out - no recent blood in stool since prior to colonoscopy  Review of Systems  No cp/palp/sob/ swelling  Feeling good overall  Objective:   Physical Exam   Constitutional: He appears well-developed. No distress. HENT:   Mouth/Throat: Oropharynx is clear and moist.   Eyes: Conjunctivae are normal. No scleral icterus. Cardiovascular: Normal rate, regular rhythm and normal heart sounds. Exam reveals no gallop. No murmur heard. Pulmonary/Chest: Effort normal and breath sounds normal. No respiratory distress. He has no wheezes. He has no rhonchi. He has no rales. Abdominal: Soft. Bowel sounds are normal. He exhibits no distension. There is no tenderness. Musculoskeletal: He exhibits no edema. Neurological: He is alert. Skin: Skin is intact. No rash noted. No erythema. Psychiatric: He has a normal mood and affect. Assessment:       Diagnosis Orders   1. Type 2 diabetes mellitus with diabetic neuropathy, unspecified whether long term insulin use (HCC)  POCT glycosylated hemoglobin (Hb A1C)    POCT microalbumin    Comprehensive Metabolic Panel   2. Hypercholesteremia  Lipid Panel   3. Exposure to sexually transmitted disease (STD)  HIV Screen    Syphilis Antibody Cascading Reflex    C.trachomatis N.gonorrhoeae DNA, Urine   4. Essential hypertension  Comprehensive Metabolic Panel   5.  Anxiety             Plan:      FASTING LABS TODAY- not on vascepa - on crestor regularly  bp good on avapro/ inderal    Diet/ exercise d/w pt - goal of changing over to diet pop d/w pt  Actos, farxiga, janumet w/ insulin  paxil for anxiety  Reduction in a1c 14.4 to 12.4%  Increase lantus - split dosing to 45 u bid and increase gradually to goal am bs 130 or less  Eye exam when bs stabilize  Consider vascepa if tg >400  O/w cont present meds  Colonoscopy recently but needs again in September 2/2 poor clean out - 1 polyp   Alida Munguia MD

## 2019-07-06 LAB
HIV AG/AB: NORMAL
HIV ANTIGEN: NORMAL
HIV-1 ANTIBODY: NORMAL
HIV-2 AB: NORMAL
TOTAL SYPHILLIS IGG/IGM: NORMAL

## 2019-07-08 LAB
C. TRACHOMATIS DNA ,URINE: NEGATIVE
N. GONORRHOEAE DNA, URINE: NEGATIVE

## 2019-11-27 RX ORDER — SITAGLIPTIN AND METFORMIN HYDROCHLORIDE 1000; 50 MG/1; MG/1
TABLET, FILM COATED, EXTENDED RELEASE ORAL
Qty: 180 TABLET | Refills: 0 | Status: SHIPPED | OUTPATIENT
Start: 2019-11-27 | End: 2020-03-25

## 2020-03-02 RX ORDER — SITAGLIPTIN AND METFORMIN HYDROCHLORIDE 1000; 50 MG/1; MG/1
TABLET, FILM COATED, EXTENDED RELEASE ORAL
Qty: 180 TABLET | Refills: 4 | OUTPATIENT
Start: 2020-03-02

## 2020-03-25 ENCOUNTER — OFFICE VISIT (OUTPATIENT)
Dept: FAMILY MEDICINE CLINIC | Age: 60
End: 2020-03-25
Payer: COMMERCIAL

## 2020-03-25 VITALS
HEIGHT: 71 IN | WEIGHT: 197 LBS | SYSTOLIC BLOOD PRESSURE: 130 MMHG | BODY MASS INDEX: 27.58 KG/M2 | HEART RATE: 94 BPM | RESPIRATION RATE: 12 BRPM | DIASTOLIC BLOOD PRESSURE: 74 MMHG | OXYGEN SATURATION: 99 %

## 2020-03-25 LAB — HBA1C MFR BLD: 12.2 %

## 2020-03-25 PROCEDURE — 83036 HEMOGLOBIN GLYCOSYLATED A1C: CPT | Performed by: FAMILY MEDICINE

## 2020-03-25 PROCEDURE — 99214 OFFICE O/P EST MOD 30 MIN: CPT | Performed by: FAMILY MEDICINE

## 2020-03-25 RX ORDER — ROSUVASTATIN CALCIUM 10 MG/1
10 TABLET, COATED ORAL NIGHTLY
Qty: 90 TABLET | Refills: 3 | Status: SHIPPED | OUTPATIENT
Start: 2020-03-25 | End: 2022-03-21 | Stop reason: SDUPTHER

## 2020-03-25 RX ORDER — PIOGLITAZONEHYDROCHLORIDE 30 MG/1
30 TABLET ORAL DAILY
Qty: 90 TABLET | Refills: 3 | Status: SHIPPED | OUTPATIENT
Start: 2020-03-25 | End: 2021-03-02 | Stop reason: SDUPTHER

## 2020-03-25 RX ORDER — IRBESARTAN 150 MG/1
150 TABLET ORAL NIGHTLY
Qty: 90 TABLET | Refills: 3 | Status: SHIPPED | OUTPATIENT
Start: 2020-03-25 | End: 2021-03-02 | Stop reason: SDUPTHER

## 2020-03-25 RX ORDER — PROPRANOLOL HCL 60 MG
CAPSULE, EXTENDED RELEASE 24HR ORAL
Qty: 90 CAPSULE | Refills: 3 | Status: SHIPPED | OUTPATIENT
Start: 2020-03-25 | End: 2021-03-02 | Stop reason: SDUPTHER

## 2020-03-25 RX ORDER — PROPRANOLOL HCL 60 MG
CAPSULE, EXTENDED RELEASE 24HR ORAL
Qty: 90 CAPSULE | Refills: 3 | Status: SHIPPED | OUTPATIENT
Start: 2020-03-25 | End: 2020-03-25 | Stop reason: SDUPTHER

## 2020-03-25 RX ORDER — METFORMIN HYDROCHLORIDE 500 MG/1
2000 TABLET, EXTENDED RELEASE ORAL
Qty: 360 TABLET | Refills: 1 | Status: SHIPPED | OUTPATIENT
Start: 2020-03-25 | End: 2020-05-01 | Stop reason: SDUPTHER

## 2020-03-25 RX ORDER — OMEPRAZOLE 20 MG/1
20 CAPSULE, DELAYED RELEASE ORAL DAILY
Qty: 90 CAPSULE | Refills: 3 | Status: SHIPPED | OUTPATIENT
Start: 2020-03-25 | End: 2020-03-25 | Stop reason: SDUPTHER

## 2020-03-25 RX ORDER — INSULIN GLARGINE 100 [IU]/ML
80 INJECTION, SOLUTION SUBCUTANEOUS NIGHTLY
Qty: 45 PEN | Refills: 3 | Status: SHIPPED | OUTPATIENT
Start: 2020-03-25 | End: 2020-03-25 | Stop reason: SDUPTHER

## 2020-03-25 RX ORDER — OMEPRAZOLE 20 MG/1
20 CAPSULE, DELAYED RELEASE ORAL DAILY
Qty: 90 CAPSULE | Refills: 3 | Status: SHIPPED | OUTPATIENT
Start: 2020-03-25 | End: 2021-03-02 | Stop reason: SDUPTHER

## 2020-03-25 RX ORDER — DULAGLUTIDE 0.75 MG/.5ML
0.75 INJECTION, SOLUTION SUBCUTANEOUS WEEKLY
Qty: 4 PEN | Refills: 2 | Status: SHIPPED | OUTPATIENT
Start: 2020-03-25 | End: 2020-08-27 | Stop reason: SDUPTHER

## 2020-03-25 RX ORDER — ROSUVASTATIN CALCIUM 10 MG/1
10 TABLET, COATED ORAL NIGHTLY
Qty: 90 TABLET | Refills: 3 | Status: SHIPPED | OUTPATIENT
Start: 2020-03-25 | End: 2020-03-25 | Stop reason: SDUPTHER

## 2020-03-25 RX ORDER — INDOMETHACIN 50 MG/1
50 CAPSULE ORAL
Qty: 20 CAPSULE | Refills: 1 | Status: SHIPPED | OUTPATIENT
Start: 2020-03-25 | End: 2020-08-27

## 2020-03-25 RX ORDER — PAROXETINE 30 MG/1
TABLET, FILM COATED ORAL
Qty: 180 TABLET | Refills: 3 | Status: SHIPPED | OUTPATIENT
Start: 2020-03-25 | End: 2020-03-25 | Stop reason: SDUPTHER

## 2020-03-25 RX ORDER — INSULIN GLARGINE 100 [IU]/ML
80 INJECTION, SOLUTION SUBCUTANEOUS NIGHTLY
Qty: 45 PEN | Refills: 3 | Status: SHIPPED | OUTPATIENT
Start: 2020-03-25 | End: 2022-04-27 | Stop reason: SDUPTHER

## 2020-03-25 RX ORDER — PIOGLITAZONEHYDROCHLORIDE 30 MG/1
30 TABLET ORAL DAILY
Qty: 90 TABLET | Refills: 3 | Status: SHIPPED | OUTPATIENT
Start: 2020-03-25 | End: 2020-03-25 | Stop reason: SDUPTHER

## 2020-03-25 RX ORDER — PAROXETINE 30 MG/1
TABLET, FILM COATED ORAL
Qty: 180 TABLET | Refills: 3 | Status: SHIPPED | OUTPATIENT
Start: 2020-03-25 | End: 2021-03-02 | Stop reason: SDUPTHER

## 2020-03-25 RX ORDER — METFORMIN HYDROCHLORIDE 500 MG/1
2000 TABLET, EXTENDED RELEASE ORAL
Qty: 360 TABLET | Refills: 1 | Status: SHIPPED | OUTPATIENT
Start: 2020-03-25 | End: 2020-03-25 | Stop reason: SDUPTHER

## 2020-03-25 RX ORDER — ICOSAPENT ETHYL 1000 MG/1
2 CAPSULE ORAL 2 TIMES DAILY
Qty: 360 CAPSULE | Refills: 3 | Status: SHIPPED | OUTPATIENT
Start: 2020-03-25 | End: 2020-08-27

## 2020-03-25 RX ORDER — IRBESARTAN 150 MG/1
150 TABLET ORAL NIGHTLY
Qty: 90 TABLET | Refills: 3 | Status: SHIPPED | OUTPATIENT
Start: 2020-03-25 | End: 2020-03-25 | Stop reason: SDUPTHER

## 2020-03-25 ASSESSMENT — PATIENT HEALTH QUESTIONNAIRE - PHQ9
SUM OF ALL RESPONSES TO PHQ9 QUESTIONS 1 & 2: 0
SUM OF ALL RESPONSES TO PHQ QUESTIONS 1-9: 0
2. FEELING DOWN, DEPRESSED OR HOPELESS: 0
1. LITTLE INTEREST OR PLEASURE IN DOING THINGS: 0
SUM OF ALL RESPONSES TO PHQ QUESTIONS 1-9: 0

## 2020-03-25 NOTE — PROGRESS NOTES
TAKE 2 TABLETS BY MOUTH DAILY 180 tablet 3    omeprazole (PRILOSEC) 20 MG delayed release capsule Take 1 capsule by mouth daily 90 capsule 3    dapagliflozin (FARXIGA) 10 MG tablet Take 1 tablet by mouth every morning 90 tablet 3    propranolol (INDERAL LA) 60 MG extended release capsule TAKE 1 CAPSULE BY MOUTH DAILY 90 capsule 3    rosuvastatin (CRESTOR) 10 MG tablet Take 1 tablet by mouth nightly 90 tablet 3    insulin glargine (LANTUS SOLOSTAR) 100 UNIT/ML injection pen Inject 80 Units into the skin nightly 45 pen 3    Icosapent Ethyl (VASCEPA) 1 g CAPS capsule Take 2 capsules by mouth 2 times daily 360 capsule 3    ONE TOUCH ULTRA TEST strip USE AS DIRECTED DAILY 50 strip 11    B-D UF III MINI PEN NEEDLES 31G X 5 MM MISC USE AS DIRECTED WITH INSULIN 100 each 0     No current facility-administered medications for this visit. Review of Systems:   All others are negative, except as noted in the HPI.     Patient History:  Past Medical History:   Diagnosis Date    Diabetes mellitus ([facility] [address]     type II    Hypertension     Sleep apnea         Past Surgical History:   Procedure Laterality Date    CHOLECYSTECTOMY  2010        Social History     Socioeconomic History    Marital status:      Spouse name: Not on file    Number of children: Not on file    Years of education: Not on file    Highest education level: Not on file   Occupational History    Not on file   Social Needs    Financial resource strain: Not on file    Food insecurity     Worry: Not on file     Inability: Not on file    Transportation needs     Medical: Not on file     Non-medical: Not on file   Tobacco Use    Smoking status: Never Smoker    Smokeless tobacco: Never Used    Tobacco comment: Educated to avoid tobacco.    Substance and Sexual Activity    Alcohol use: No    Drug use: Yes     Types: Marijuana     Comment: occasional    Sexual activity: Not on file   Lifestyle    Physical activity     Days per week: Not 14.4 04/03/2019        Lab Results   Component Value Date    WBC 5.4 04/03/2019    HGB 15.3 04/03/2019    HCT 44.5 04/03/2019    MCV 83.0 04/03/2019     04/03/2019        Lab Results   Component Value Date     (L) 07/05/2019    K 5.0 07/05/2019    CL 95 (L) 07/05/2019    CO2 26 07/05/2019    BUN 10 07/05/2019    CREATININE 0.9 07/05/2019    GLUCOSE 358 (H) 07/05/2019    CALCIUM 10.0 07/05/2019    PROT 6.7 07/05/2019    LABALBU 4.4 07/05/2019    BILITOT 0.4 07/05/2019    ALKPHOS 121 07/05/2019    AST 13 (L) 07/05/2019    ALT 24 07/05/2019    LABGLOM >60 07/05/2019    GFRAA >60 07/05/2019    AGRATIO 1.9 07/05/2019    GLOB 2.3 07/05/2019       Lab Results   Component Value Date    CHOL 236 (H) 07/05/2019    TRIG 1,026 (H) 07/05/2019    HDL 30 (L) 07/05/2019    LDLCALC see below 07/05/2019    LABVLDL see below 07/05/2019    CHOLHDLRATIO 4.9 (H) 07/18/2011       Lab Results   Component Value Date    TSH 0.64 05/24/2012        Lab Results   Component Value Date    VITD25 19.8 (L) 04/17/2017    VITD25 23.7 (L) 04/20/2016    VITD25 18.6 (L) 04/17/2015            Health Maintenance Review:  Health Maintenance Due   Topic Date Due    Diabetic retinal exam  04/17/1972    Hepatitis B vaccine (1 of 3 - Risk 3-dose series) 04/17/1981    Shingles Vaccine (1 of 2) 04/17/2012    A1C test (Diabetic or Prediabetic)  10/05/2019    Diabetic foot exam  04/03/2020         Cancer Screenings:  Lab Results   Component Value Date    PSA 0.81 08/02/2018    PSA 0.64 04/20/2016    PSA 0.71 01/23/2013     Immunizations:  Immunization History   Administered Date(s) Administered    Influenza Virus Vaccine 10/10/2014    Influenza, Intradermal, Preservative free 10/25/2013    Influenza, Intradermal, Quadrivalent, Preservative Free 10/27/2017    Influenza, Quadv, IM, PF (6 mo and older Fluzone, Flulaval, Fluarix, and 3 yrs and older Afluria) 11/23/2018    Pneumococcal Polysaccharide ([id number]) 06/06/2014    Tdap (Boostrix, insulin (HCC)  -     Discontinue: insulin glargine (LANTUS SOLOSTAR) 100 UNIT/ML injection pen; Inject 80 Units into the skin nightly  -     insulin glargine (LANTUS SOLOSTAR) 100 UNIT/ML injection pen; Inject 80 Units into the skin nightly    Type 2 diabetes mellitus with foot ulcer, unspecified whether long term insulin use ([facility]  - Feet care dw/ pt     Other orders  -     irbesartan (AVAPRO) 150 MG tablet; Take 1 tablet by mouth nightly  -     omeprazole (PRILOSEC) 20 MG delayed release capsule; Take 1 capsule by mouth daily  -     propranolol (INDERAL LA) 60 MG extended release capsule; TAKE 1 CAPSULE BY MOUTH DAILY  -     rosuvastatin (CRESTOR) 10 MG tablet; Take 1 tablet by mouth nightly  -     Icosapent Ethyl (VASCEPA) 1 g CAPS capsule; Take 2 capsules by mouth 2 times daily        I have reviewed patient's pertinent medical history, relevant laboratory and imaging studies, and past/future health maintenance. Patient's medications have been reviewed and were discussed with the patient. Refills given today, see note below. Discussed with the patient the importance of adhering to their current medication regimen as directed. Advised the patient that they should continue to work on eating a healthy balanced diet and staying active by exercising within their personal limits. Patient was advised to keep future appointments with their respective specialty care team(s). Patient had the opportunity to ask questions, all of which were answered to the best of my ability and with patient satisfaction. Patient advised to schedule a return visit for reevaluation in 2 weeks for diabetes follow-up. Patient understands and is agreeable with the care plan following today's visit. Patient is to schedule an appointment for any new or worsening symptoms.        Requested Prescriptions     Signed Prescriptions Disp Refills    indomethacin (INDOCIN) 50 MG capsule 20 capsule 1     Sig: Take 1 capsule by mouth 3 times daily (with meals)    Dulaglutide (TRULICITY) 3.78 EI/9.9KN SOPN 4 pen 2     Sig: Inject 0.75 mg into the skin once a week    metFORMIN (GLUCOPHAGE-XR) 500 MG extended release tablet 360 tablet 1     Sig: Take 4 tablets by mouth daily (with breakfast)    irbesartan (AVAPRO) 150 MG tablet 90 tablet 3     Sig: Take 1 tablet by mouth nightly    pioglitazone (ACTOS) 30 MG tablet 90 tablet 3     Sig: Take 1 tablet by mouth daily    PARoxetine (PAXIL) 30 MG tablet 180 tablet 3     Sig: TAKE 2 TABLETS BY MOUTH DAILY    omeprazole (PRILOSEC) 20 MG delayed release capsule 90 capsule 3     Sig: Take 1 capsule by mouth daily    dapagliflozin (FARXIGA) 10 MG tablet 90 tablet 3     Sig: Take 1 tablet by mouth every morning    propranolol (INDERAL LA) 60 MG extended release capsule 90 capsule 3     Sig: TAKE 1 CAPSULE BY MOUTH DAILY    rosuvastatin (CRESTOR) 10 MG tablet 90 tablet 3     Sig: Take 1 tablet by mouth nightly    insulin glargine (LANTUS SOLOSTAR) 100 UNIT/ML injection pen 45 pen 3     Sig: Inject 80 Units into the skin nightly    Icosapent Ethyl (VASCEPA) 1 g CAPS capsule 360 capsule 3     Sig: Take 2 capsules by mouth 2 times daily      Orders Placed This Encounter   Procedures    POCT glycosylated hemoglobin (Hb A1C)           By signing my name below, IKaterin, attest that this documentation has been prepared under the direction and in the presence of Dora Valentin MD.   Electronically Signed: Nico Pradhan, 3/25/2020, 2:34 PM.      I, Dora Valentin MD, personally performed the services described in this documentation. All medical record entries made by the nico were at my direction and in my presence. I have reviewed the chart and discharge instructions (if applicable) and agree that the record reflects my personal performance and is accurate and complete.  Dora Valentin MD, 3/25/2020, 3:06 PM.

## 2020-03-26 ENCOUNTER — TELEPHONE (OUTPATIENT)
Dept: ADMINISTRATIVE | Age: 60
End: 2020-03-26

## 2020-04-08 ENCOUNTER — TELEMEDICINE (OUTPATIENT)
Dept: FAMILY MEDICINE CLINIC | Age: 60
End: 2020-04-08
Payer: COMMERCIAL

## 2020-04-08 PROCEDURE — 99213 OFFICE O/P EST LOW 20 MIN: CPT | Performed by: NURSE PRACTITIONER

## 2020-04-08 RX ORDER — PREDNISONE 10 MG/1
TABLET ORAL
Qty: 1 TABLET | Refills: 0 | Status: SHIPPED | OUTPATIENT
Start: 2020-04-08 | End: 2020-08-27

## 2020-04-08 RX ORDER — CEPHALEXIN 500 MG/1
500 CAPSULE ORAL 4 TIMES DAILY
Qty: 20 CAPSULE | Refills: 0 | Status: SHIPPED | OUTPATIENT
Start: 2020-04-08 | End: 2020-04-18

## 2020-04-08 ASSESSMENT — ENCOUNTER SYMPTOMS: COLOR CHANGE: 1

## 2020-04-08 NOTE — PROGRESS NOTES
General: He is awake. Appearance: Normal appearance. He is well-developed, well-groomed and normal weight. He is not ill-appearing, toxic-appearing or diaphoretic. Skin:     Comments: RIGHT GREAT TOE REDNESS/SWELLING NOTED AT PIP   Neurological:      Mental Status: He is alert. Psychiatric:         Attention and Perception: Attention and perception normal.         Mood and Affect: Mood and affect normal.         Speech: Speech normal.         Behavior: Behavior normal. Behavior is cooperative. Thought Content: Thought content normal.         Cognition and Memory: Cognition and memory normal.         Judgment: Judgment normal.       April Hinojosa was seen today for toe pain. Diagnoses and all orders for this visit:    Cellulitis of toe of right foot  DIFFERENTIAL DIAGNOSES  GOUT  -     cephALEXin (KEFLEX) 500 MG capsule;  Take 1 capsule by mouth 4 times daily for 10 days  -     predniSONE (DELTASONE) 10 MG tablet; 3 tabs x 2 d 2 tabs x 2 d 1  tab x 2 d in am with food  avoid NSAIDs while on this medication

## 2020-04-13 RX ORDER — PEN NEEDLE, DIABETIC 31 GX5/16"
NEEDLE, DISPOSABLE MISCELLANEOUS
Qty: 100 EACH | Refills: 1 | Status: SHIPPED | OUTPATIENT
Start: 2020-04-13 | End: 2021-07-05 | Stop reason: SDUPTHER

## 2020-05-01 ENCOUNTER — TELEPHONE (OUTPATIENT)
Dept: ADMINISTRATIVE | Age: 60
End: 2020-05-01

## 2020-05-07 RX ORDER — GLIPIZIDE 10 MG/1
10 TABLET, FILM COATED, EXTENDED RELEASE ORAL DAILY
Qty: 90 TABLET | Refills: 1 | Status: SHIPPED | OUTPATIENT
Start: 2020-05-07 | End: 2020-10-19

## 2020-08-05 RX ORDER — DULAGLUTIDE 0.75 MG/.5ML
INJECTION, SOLUTION SUBCUTANEOUS
Qty: 2 ML | OUTPATIENT
Start: 2020-08-05

## 2020-08-27 ENCOUNTER — OFFICE VISIT (OUTPATIENT)
Dept: FAMILY MEDICINE CLINIC | Age: 60
End: 2020-08-27
Payer: COMMERCIAL

## 2020-08-27 VITALS
TEMPERATURE: 98.9 F | SYSTOLIC BLOOD PRESSURE: 124 MMHG | WEIGHT: 204.2 LBS | BODY MASS INDEX: 28.59 KG/M2 | DIASTOLIC BLOOD PRESSURE: 80 MMHG | HEIGHT: 71 IN

## 2020-08-27 LAB
CREATININE URINE POCT: NORMAL
HBA1C MFR BLD: 8.4 %
MICROALBUMIN/CREAT 24H UR: NORMAL MG/G{CREAT}
MICROALBUMIN/CREAT UR-RTO: NORMAL

## 2020-08-27 PROCEDURE — 82044 UR ALBUMIN SEMIQUANTITATIVE: CPT | Performed by: NURSE PRACTITIONER

## 2020-08-27 PROCEDURE — 99214 OFFICE O/P EST MOD 30 MIN: CPT | Performed by: NURSE PRACTITIONER

## 2020-08-27 PROCEDURE — 3052F HG A1C>EQUAL 8.0%<EQUAL 9.0%: CPT | Performed by: NURSE PRACTITIONER

## 2020-08-27 PROCEDURE — 83036 HEMOGLOBIN GLYCOSYLATED A1C: CPT | Performed by: NURSE PRACTITIONER

## 2020-08-27 RX ORDER — DULAGLUTIDE 0.75 MG/.5ML
0.75 INJECTION, SOLUTION SUBCUTANEOUS WEEKLY
Qty: 4 PEN | Refills: 2 | Status: SHIPPED | OUTPATIENT
Start: 2020-08-27 | End: 2021-02-16

## 2020-08-27 ASSESSMENT — ENCOUNTER SYMPTOMS
SORE THROAT: 0
NAUSEA: 0
SINUS PRESSURE: 0
EYE PAIN: 0
EYE DISCHARGE: 0
EYE REDNESS: 0
SHORTNESS OF BREATH: 0
DIARRHEA: 0
COUGH: 0
ABDOMINAL PAIN: 0
SINUS PAIN: 0
ABDOMINAL DISTENTION: 0
WHEEZING: 0
VOMITING: 0

## 2020-08-27 NOTE — PROGRESS NOTES
2020     Rogerio Gomez (:  1960) is a 64 y.o. male, here for evaluation of the following medical concerns:    HPI  Treatment Adherence:   Medication compliance:  compliant most of the time Ran out of Trulicity. Diet compliance:  noncompliant: Fluctuating diet. Sometimes had no appetite. Sometimes eats large frequent meals. Weight trend: fluctuating  Current exercise: no regular exercise. States that he currently working from home. Plans to increase exercise now that this is the case. Barriers: time constraints    Diabetes Mellitus Type 2: Current symptoms/problems include none. Home blood sugar records: patient tests once a day  Any episodes of hypoglycemia? no  Eye exam current (within one year): no  Tobacco history: He  reports that he has never smoked. He has never used smokeless tobacco.   Daily Aspirin? No    Hypertension:  Home blood pressure monitoring: No.  He is not adherent to a low sodium diet. Patient denies chest pain, shortness of breath, headache, lightheadedness, blurred vision, peripheral edema, palpitations, dry cough and fatigue. Antihypertensive medication side effects: no medication side effects noted. Use of agents associated with hypertension: none. Hyperlipidemia:  No new myalgias or GI upset on rosuvastatin (Crestor). Lab Results   Component Value Date    LABA1C 8.4 2020    LABA1C 12.2 2020    LABA1C 12.4 2019     Lab Results   Component Value Date    LABMICR 7.40 (H) 2018    CREATININE 0.9 2019     Lab Results   Component Value Date    ALT 24 2019    AST 13 (L) 2019     Lab Results   Component Value Date    CHOL 236 (H) 2019    TRIG 1,026 (H) 2019    HDL 30 (L) 2019    LDLCALC see below 2019    LDLDIRECT 88 2019          Review of Systems   Constitutional: Negative for chills and fever.    HENT: Negative for ear discharge, ear pain, hearing loss, sinus pressure, sinus pain and sore throat. Eyes: Negative for pain, discharge and redness. Respiratory: Negative for cough, shortness of breath and wheezing. Cardiovascular: Negative for chest pain and palpitations. Gastrointestinal: Negative for abdominal distention, abdominal pain, diarrhea, nausea and vomiting. Genitourinary: Negative for dysuria and hematuria. Musculoskeletal: Negative for myalgias. Skin: Negative for rash. Neurological: Negative for weakness, numbness and headaches. Prior to Visit Medications    Medication Sig Taking?  Authorizing Provider   Dulaglutide (TRULICITY) 9.08 BL/0.3AI SOPN Inject 0.75 mg into the skin once a week Yes EB Capone CNP   triamcinolone (KENALOG) 0.1 % ointment Apply topically 2 times daily for 7 days Yes EB Capone CNP   glipiZIDE (GLUCOTROL XL) 10 MG extended release tablet Take 1 tablet by mouth daily Yes Sarah Grant MD   metFORMIN (GLUCOPHAGE-XR) 500 MG extended release tablet Take 4 tablets by mouth daily (with breakfast) Yes Sarah Grant MD   Insulin Pen Needle (B-D UF III MINI PEN NEEDLES) 31G X 5 MM MISC USE AS DIRECTED WITH INSULIN Yes Sarah Grant MD   blood glucose test strips (ONE TOUCH ULTRA TEST) strip 1 each by In Vitro route daily USE AS DIRECTED DAILY and as needed Yes Sarah Grant MD   irbesartan (AVAPRO) 150 MG tablet Take 1 tablet by mouth nightly Yes Sarah Grant MD   pioglitazone (ACTOS) 30 MG tablet Take 1 tablet by mouth daily Yes Sarah Grant MD   PARoxetine (PAXIL) 30 MG tablet TAKE 2 TABLETS BY MOUTH DAILY Yes Sarah Grant MD   omeprazole (PRILOSEC) 20 MG delayed release capsule Take 1 capsule by mouth daily Yes Sarah Grant MD   propranolol (INDERAL LA) 60 MG extended release capsule TAKE 1 CAPSULE BY MOUTH DAILY Yes Sarah Grant MD   rosuvastatin (CRESTOR) 10 MG tablet Take 1 tablet by mouth nightly Yes Sarah Grant MD   insulin glargine (LANTUS SOLOSTAR) 100 UNIT/ML injection pen Inject 80 Units into the skin nightly Yes Sanjay Pina MD        Social History     Tobacco Use    Smoking status: Never Smoker    Smokeless tobacco: Never Used    Tobacco comment: Educated to avoid tobacco.    Substance Use Topics    Alcohol use: No        Vitals:    08/27/20 1459   BP: 124/80   Site: Left Upper Arm   Position: Sitting   Cuff Size: Medium Adult   Temp: 98.9 °F (37.2 °C)   TempSrc: Oral   Weight: 204 lb 3.2 oz (92.6 kg)  Comment: SHOES ON   Height: 5' 11\" (1.803 m)     Estimated body mass index is 28.48 kg/m² as calculated from the following:    Height as of this encounter: 5' 11\" (1.803 m). Weight as of this encounter: 204 lb 3.2 oz (92.6 kg). Physical Exam  Vitals signs reviewed. Constitutional:       Appearance: Normal appearance. He is normal weight. HENT:      Head: Normocephalic and atraumatic. Right Ear: Tympanic membrane, ear canal and external ear normal.      Left Ear: Tympanic membrane, ear canal and external ear normal.      Nose: Nose normal.      Mouth/Throat:      Mouth: Mucous membranes are moist.      Pharynx: Oropharynx is clear. No posterior oropharyngeal erythema. Eyes:      General: No scleral icterus. Right eye: No discharge. Left eye: No discharge. Extraocular Movements: Extraocular movements intact. Pupils: Pupils are equal, round, and reactive to light. Neck:      Musculoskeletal: Normal range of motion and neck supple. Cardiovascular:      Rate and Rhythm: Normal rate and regular rhythm. Pulses: Normal pulses. Heart sounds: Normal heart sounds. Pulmonary:      Effort: Pulmonary effort is normal.      Breath sounds: Normal breath sounds. No wheezing. Abdominal:      General: Bowel sounds are normal.      Palpations: Abdomen is soft. Tenderness: There is no abdominal tenderness. There is no guarding or rebound. Musculoskeletal: Normal range of motion. Comments: Right toe is swollen with solid nodule at the interphalangeal joint.  No for malignant neoplasm of prostate  - PSA, Total and Free; Future          Return in about 6 months (around 2/27/2021) for Follow-up diabetes/hypertension/hyperlipidemia. An electronic signature was used to authenticate this note.     --EB Rangel - CNP on 8/27/2020 at 3:59 PM

## 2020-09-18 DIAGNOSIS — E78.2 MIXED HYPERLIPIDEMIA: Chronic | ICD-10-CM

## 2020-09-18 DIAGNOSIS — I10 ESSENTIAL HYPERTENSION, BENIGN: ICD-10-CM

## 2020-09-18 DIAGNOSIS — R22.41 SKIN NODULE OF TOE OF RIGHT FOOT: ICD-10-CM

## 2020-09-18 DIAGNOSIS — E11.40 TYPE 2 DIABETES MELLITUS WITH DIABETIC NEUROPATHY, UNSPECIFIED WHETHER LONG TERM INSULIN USE (HCC): ICD-10-CM

## 2020-09-18 DIAGNOSIS — Z12.5 SCREENING FOR MALIGNANT NEOPLASM OF PROSTATE: ICD-10-CM

## 2020-09-18 LAB
A/G RATIO: 2 (ref 1.1–2.2)
ALBUMIN SERPL-MCNC: 4.3 G/DL (ref 3.4–5)
ALP BLD-CCNC: 96 U/L (ref 40–129)
ALT SERPL-CCNC: 34 U/L (ref 10–40)
ANION GAP SERPL CALCULATED.3IONS-SCNC: 12 MMOL/L (ref 3–16)
AST SERPL-CCNC: 23 U/L (ref 15–37)
BILIRUB SERPL-MCNC: 0.3 MG/DL (ref 0–1)
BUN BLDV-MCNC: 10 MG/DL (ref 7–20)
CALCIUM SERPL-MCNC: 9.6 MG/DL (ref 8.3–10.6)
CHLORIDE BLD-SCNC: 98 MMOL/L (ref 99–110)
CHOLESTEROL, FASTING: 154 MG/DL (ref 0–199)
CO2: 24 MMOL/L (ref 21–32)
CREAT SERPL-MCNC: 0.7 MG/DL (ref 0.9–1.3)
GFR AFRICAN AMERICAN: >60
GFR NON-AFRICAN AMERICAN: >60
GLOBULIN: 2.2 G/DL
GLUCOSE BLD-MCNC: 186 MG/DL (ref 70–99)
HDLC SERPL-MCNC: 35 MG/DL (ref 40–60)
LDL CHOLESTEROL CALCULATED: ABNORMAL MG/DL
LDL CHOLESTEROL DIRECT: 74 MG/DL
POTASSIUM SERPL-SCNC: 4.5 MMOL/L (ref 3.5–5.1)
SODIUM BLD-SCNC: 134 MMOL/L (ref 136–145)
TOTAL PROTEIN: 6.5 G/DL (ref 6.4–8.2)
TRIGLYCERIDE, FASTING: 345 MG/DL (ref 0–150)
URIC ACID, SERUM: 6.9 MG/DL (ref 3.5–7.2)
VLDLC SERPL CALC-MCNC: ABNORMAL MG/DL

## 2020-09-22 LAB
PROSTATE SPECIFIC ANTIGEN FREE: 0.2 UG/L
PROSTATE SPECIFIC ANTIGEN PERCENT FREE: 18.2 %
PROSTATE SPECIFIC ANTIGEN: 1.1 UG/L (ref 0–4)

## 2020-10-12 RX ORDER — METFORMIN HYDROCHLORIDE 500 MG/1
TABLET, EXTENDED RELEASE ORAL
Qty: 360 TABLET | Refills: 3 | Status: SHIPPED | OUTPATIENT
Start: 2020-10-12 | End: 2021-03-29 | Stop reason: SDUPTHER

## 2020-10-13 ENCOUNTER — VIRTUAL VISIT (OUTPATIENT)
Dept: FAMILY MEDICINE CLINIC | Age: 60
End: 2020-10-13
Payer: COMMERCIAL

## 2020-10-13 PROCEDURE — 99213 OFFICE O/P EST LOW 20 MIN: CPT | Performed by: FAMILY MEDICINE

## 2020-10-13 NOTE — PROGRESS NOTES
10/13/2020    TELEHEALTH EVALUATION -- Audio/Visual (During [facility]-22 public health emergency)    HPI:    Carroll Sesay (:  1960) has requested an audio/video evaluation for the following concern(s):    Chief Complaint   Patient presents with    Abdominal Pain     PT C/O STOMACH ISSUES CAUSING VOMITING, PT HAD GALLBLADDER REMOVED IN , SYMPTOMS ARE SIMILIAR TO GB PER PREVIOUS REPORT. I SCANNED IN THE MEDIA FILE. almost weekly to q2w reaction -gets sensation in stomach - gets hard  gerd sxs w/ diarrhea all comes on quickly  Continues to reoccur - since may or   Had gallbladder removed   Due for another spell. Like something gets stuck. No fever - not a lot of pain - feels like stomach getting hard - no sore throat. Random - not tied to eating - has happened in middle of night or middle of afternoon. Has fatty liver   Past Surgical History:   Procedure Laterality Date    CHOLECYSTECTOMY       Ok in 24 hours - eats whatever - feels drained when gets attack. Review of Systems    Prior to Visit Medications    Medication Sig Taking?  Authorizing Provider   metFORMIN (GLUCOPHAGE-XR) 500 MG extended release tablet TAKE 4 TABLETS DAILY WITH BREAKFAST Yes EB Reinoso CNP   Dulaglutide (TRULICITY) 4.85 FN/3.3ML SOPN Inject 0.75 mg into the skin once a week Yes EB Reinoso CNP   glipiZIDE (GLUCOTROL XL) 10 MG extended release tablet Take 1 tablet by mouth daily Yes Brooke Eng MD   Insulin Pen Needle (B-D UF III MINI PEN NEEDLES) 31G X 5 MM MISC USE AS DIRECTED WITH INSULIN Yes Brooke Eng MD   blood glucose test strips (ONE TOUCH ULTRA TEST) strip 1 each by In Vitro route daily USE AS DIRECTED DAILY and as needed Yes Brooke Eng MD   irbesartan (AVAPRO) 150 MG tablet Take 1 tablet by mouth nightly Yes Brooke Eng MD   pioglitazone (ACTOS) 30 MG tablet Take 1 tablet by mouth daily Yes Brooke Eng MD   PARoxetine (PAXIL) 30 MG tablet TAKE 2 TABLETS BY MOUTH DAILY function) (limited exam to video visit)          [] No gaze palsy        [] Abnormal-         Skin:        [x] No significant exanthematous lesions or discoloration noted on facial skin         [] Abnormal-            Psychiatric:       [x] Normal Affect [] No Hallucinations        [] Abnormal-     Other pertinent observable physical exam findings-     ASSESSMENT/PLAN:  There are no diagnoses linked to this encounter. Diagnosis Orders   1. Nausea vomiting and diarrhea  MRI ABDOMEN W WO CONTRAST MRCP     Recurrent intermittent sxs similar to biliary colic - s/p cholecystectomy - ? Recurrent psbo? Check mrcp as increasing sxs   Consider gi vs. Surgery pending results of mrcp. Hold on meds as symptoms  No follow-ups on file. Dani Pereira is a 64 y.o. male being evaluated by a Virtual Visit (video visit) encounter to address concerns as mentioned above. A caregiver was present when appropriate. Due to this being a TeleHealth encounter (During [facility]), evaluation of the following organ systems was limited: Vitals/Constitutional/EENT/Resp/CV/GI//MS/Neuro/Skin/Heme-Lymph-Imm. Pursuant to the emergency declaration under the [address] [place] [address] authority and the [employer] Act, this Virtual Visit was conducted with patient's (and/or legal guardian's) consent, to reduce the patient's risk of exposure to COVID-19 and provide necessary medical care. The patient (and/or legal guardian) has also been advised to contact this office for worsening conditions or problems, and seek emergency medical treatment and/or call 911 if deemed necessary. Patient identification was verified at the start of the visit: Yes    Total time spent on this encounter: 11-20 minutes    Services were provided through a video synchronous discussion virtually to substitute for in-person clinic visit.  Patient and provider

## 2020-10-20 ENCOUNTER — IMMUNIZATION (OUTPATIENT)
Dept: FAMILY MEDICINE CLINIC | Age: 60
End: 2020-10-20
Payer: COMMERCIAL

## 2020-10-20 PROCEDURE — 90471 IMMUNIZATION ADMIN: CPT | Performed by: FAMILY MEDICINE

## 2020-10-20 PROCEDURE — 90686 IIV4 VACC NO PRSV 0.5 ML IM: CPT | Performed by: FAMILY MEDICINE

## 2020-10-20 NOTE — PROGRESS NOTES
Vaccine Information Sheet, \"Influenza - Inactivated\"  given to Jean Moss, or parent/legal guardian of  Jean Moss and verbalized understanding. Patient responses:    Have you ever had a reaction to a flu vaccine? No  Do you have any current illness? No  Have you ever had Guillian [place] Syndrome? No  Do you have a serious allergy to any of the follow: Neomycin, Polymyxin, Thimerosal, eggs or egg products? No    Flu vaccine given per order. Please see immunization tab. Risks and benefits explained. Current VIS given.       Immunizations Administered     Name Date Dose Route    Influenza, Quadv, IM, PF (6 mo and older Fluzone, Flulaval, Fluarix, and 3 yrs and older Afluria) 10/20/2020 0.5 mL Intramuscular    Site: Deltoid- Left    Lot: [id number]    NDC: 08808-418-44

## 2020-10-27 ENCOUNTER — HOSPITAL ENCOUNTER (OUTPATIENT)
Dept: MRI IMAGING | Age: 60
Discharge: HOME | End: 2020-10-27
Payer: COMMERCIAL

## 2020-10-27 PROCEDURE — 74181 MRI ABDOMEN W/O CONTRAST: CPT

## 2021-02-16 DIAGNOSIS — E11.40 TYPE 2 DIABETES MELLITUS WITH DIABETIC NEUROPATHY, UNSPECIFIED WHETHER LONG TERM INSULIN USE (HCC): ICD-10-CM

## 2021-02-16 RX ORDER — DULAGLUTIDE 0.75 MG/.5ML
INJECTION, SOLUTION SUBCUTANEOUS
Qty: 2 ML | Refills: 0 | Status: SHIPPED | OUTPATIENT
Start: 2021-02-16 | End: 2021-03-02

## 2021-03-02 ENCOUNTER — OFFICE VISIT (OUTPATIENT)
Dept: FAMILY MEDICINE CLINIC | Age: 61
End: 2021-03-02
Payer: COMMERCIAL

## 2021-03-02 VITALS
WEIGHT: 210 LBS | DIASTOLIC BLOOD PRESSURE: 80 MMHG | SYSTOLIC BLOOD PRESSURE: 122 MMHG | TEMPERATURE: 98 F | HEIGHT: 71 IN | BODY MASS INDEX: 29.4 KG/M2

## 2021-03-02 DIAGNOSIS — K21.9 GASTROESOPHAGEAL REFLUX DISEASE, UNSPECIFIED WHETHER ESOPHAGITIS PRESENT: ICD-10-CM

## 2021-03-02 DIAGNOSIS — I10 ESSENTIAL HYPERTENSION, BENIGN: ICD-10-CM

## 2021-03-02 DIAGNOSIS — E78.2 MIXED HYPERLIPIDEMIA: ICD-10-CM

## 2021-03-02 DIAGNOSIS — E11.40 TYPE 2 DIABETES MELLITUS WITH DIABETIC NEUROPATHY, UNSPECIFIED WHETHER LONG TERM INSULIN USE (HCC): Primary | ICD-10-CM

## 2021-03-02 DIAGNOSIS — F41.1 GENERALIZED ANXIETY DISORDER: ICD-10-CM

## 2021-03-02 LAB — HBA1C MFR BLD: 7.5 %

## 2021-03-02 PROCEDURE — 3051F HG A1C>EQUAL 7.0%<8.0%: CPT | Performed by: FAMILY MEDICINE

## 2021-03-02 PROCEDURE — 99214 OFFICE O/P EST MOD 30 MIN: CPT | Performed by: FAMILY MEDICINE

## 2021-03-02 PROCEDURE — 83036 HEMOGLOBIN GLYCOSYLATED A1C: CPT | Performed by: FAMILY MEDICINE

## 2021-03-02 RX ORDER — DULAGLUTIDE 1.5 MG/.5ML
1.5 INJECTION, SOLUTION SUBCUTANEOUS WEEKLY
Qty: 4 PEN | Refills: 2 | Status: SHIPPED | OUTPATIENT
Start: 2021-03-02 | End: 2021-05-11

## 2021-03-02 RX ORDER — PROPRANOLOL HCL 60 MG
CAPSULE, EXTENDED RELEASE 24HR ORAL
Qty: 90 CAPSULE | Refills: 3 | Status: SHIPPED | OUTPATIENT
Start: 2021-03-02 | End: 2022-04-08 | Stop reason: SDUPTHER

## 2021-03-02 RX ORDER — PIOGLITAZONEHYDROCHLORIDE 30 MG/1
30 TABLET ORAL DAILY
Qty: 90 TABLET | Refills: 3 | Status: SHIPPED | OUTPATIENT
Start: 2021-03-02 | End: 2022-04-08 | Stop reason: SDUPTHER

## 2021-03-02 RX ORDER — PAROXETINE 30 MG/1
TABLET, FILM COATED ORAL
Qty: 180 TABLET | Refills: 3 | Status: SHIPPED | OUTPATIENT
Start: 2021-03-02 | End: 2022-04-08 | Stop reason: SDUPTHER

## 2021-03-02 RX ORDER — OMEPRAZOLE 20 MG/1
20 CAPSULE, DELAYED RELEASE ORAL DAILY
Qty: 90 CAPSULE | Refills: 3 | Status: SHIPPED | OUTPATIENT
Start: 2021-03-02 | End: 2022-04-08 | Stop reason: SDUPTHER

## 2021-03-02 RX ORDER — IRBESARTAN 150 MG/1
150 TABLET ORAL NIGHTLY
Qty: 90 TABLET | Refills: 3 | Status: SHIPPED | OUTPATIENT
Start: 2021-03-02 | End: 2022-03-21 | Stop reason: SDUPTHER

## 2021-03-02 ASSESSMENT — PATIENT HEALTH QUESTIONNAIRE - PHQ9
2. FEELING DOWN, DEPRESSED OR HOPELESS: 0
SUM OF ALL RESPONSES TO PHQ QUESTIONS 1-9: 0
1. LITTLE INTEREST OR PLEASURE IN DOING THINGS: 0

## 2021-03-02 NOTE — PROGRESS NOTES
[facility] Medicine  Clinic Note    Date: 3/2/2021                                               Subjective:     Chief Complaint   Patient presents with    Diabetes     6 MO DM ROUTINE FOLLOW UP AIC NEEDED    utd on flu shot - seen 10/13 for n/v/d - last dm check end of august - a1c 8.4%  Colonoscopy 5/19 - one adenomatous polyp  Eye exam -  9/20 - tg 345 but LDL 74 w/ hdl of 35  Uric acid 6.9  psa normal 1.1  cmp - 134 Na, glucose 186 o/w okay    HPI  STARTED 3.18 DOSE TRULICITY  LANTUS AMOUNT HAS DROPPED A LOT - HAD COUPLE LOW BS READINGS - FELT SYMPTOMS. 20 U LANTUS NOW - AM BS -120. LIKES WEEKLY INJECTION - NO NAUSEA RECENTLY. N/V/D ISSUE DISAPPEARED SINCE LAST VISIT BUT PERIODICALLY OCCURS -  SEE LAST VISIT NOTE - NOT SURE OF TRIGGER - ? ROLE OF STRESS. FINE AROUND TIANNA  BEING LAZY 2/2 COLD - LESS ACTIVE. WORKING FROM HOME - MOST HE'S BEEN IN HOME IN YEARS - CAN GET A LITTLE ANXIOUS BEING PENT UP. HAD EYE EXAM- [facility] - WILL TRY TO GET NOTE - NO RETINOPATHY - VISION GOOD  USES CHEATERS ON COMPUTER - SOME EYE STRAIN?  NEUROPATHY NOT REAL BOTHERSOME - MAY BE BETTER W/ REDUCTION IN SUGARS  A1C 7.5% FROM 8.4%  HAVING BOTTOM TEETH REMOVED IN NEXT SEVERAL MONTHS - DENTAL PROBLEMS - NEEDS RECORDS SENT TO ORAL SURGEON. HAS PARTIAL ON TOP - ULTIMATELY WILL GET ON BOTTOM  MOOD GENERALLY OKAY - PRONE TO ANXIETY JUAN RAMON W/ BEING AT HOME ALL THE TIME  Using cpap for vanessa - sleep pretty good overall.     No cp/palp/sob/ swelling       Patient Active Problem List    Diagnosis Date Noted    Vitamin D deficiency 04/20/2016    Major depression in remission ([facility] 01/20/2016    Diabetic polyneuropathy associated with type 2 diabetes mellitus ([facility] 10/20/2015    Diabetes mellitus type 2 in nonobese ([facility] 10/20/2015    Diabetic neuropathy ([facility] 02/04/2015    Polyneuropathy 02/04/2015    Skin lesion of face 01/16/2015  Type II or unspecified type diabetes mellitus without mention of complication, not stated as uncontrolled 10/10/2014    RLS (restless legs syndrome) 06/06/2014    KELSI (obstructive sleep apnea) 09/30/2011    Other abnormal glucose 07/18/2011    Pain in limb 07/18/2011    Anxiety state 07/18/2011    Actinic keratosis 07/18/2011    Impacted cerumen 07/18/2011    Abdominal pain 07/18/2011    Essential hypertension, benign 07/18/2011    Esophageal reflux 07/18/2011    Acute frontal sinusitis 07/18/2011    Other viral warts 07/18/2011    Generalized anxiety disorder 07/18/2011    Hyperlipidemia 07/18/2011    Diabetes mellitus ([facility] 07/18/2011     Past Medical History:   Diagnosis Date    Diabetes mellitus ([facility]     type II    Hypertension     Sleep apnea         Past Surgical History:   Procedure Laterality Date    CHOLECYSTECTOMY  2010     Orders Only on 09/18/2020   Component Date Value Ref Range Status    Cholesterol, Fasting 09/18/2020 154  0 - 199 mg/dL Final    Triglyceride, Fasting 09/18/2020 345* 0 - 150 mg/dL Final    HDL 09/18/2020 35* 40 - 60 mg/dL Final    LDL Calculated 09/18/2020 see below  <100 mg/dL Final    Comment: When the triglyceride is <30 mg/dL or >300 mg/dL the  calculated LDL and  VLDL are not valid and a measured  LDL is performed.  VLDL Cholesterol Calculated 09/18/2020 see below  Not Established mg/dL Final    Comment: When the triglyceride is <30 mg/dL or >300 mg/dL the  calculated LDL and  VLDL are not valid and a measured  LDL is performed.  Uric Acid, Serum 09/18/2020 6.9  3.5 - 7.2 mg/dL Final    PSA 09/18/2020 1.1  0.0 - 4.0 ug/L Corrected    Comment:   Siemens Immulite 2000 immunometric chemiluminescent assay is used. Results obtained with different assay methods or instruments cannot be  used interchangeably.       PSA, Free 09/18/2020 0.2  ug/L Final    PSA, Free Pct 09/18/2020 18.2  % Final    Comment: In patients with total PSA concentrations of 4-10 ng/mL, the  probability of finding prostate cancer on needle biopsy in a patient  age 52-63 years is:  % Free PSA     Probability  0-10%              49%  11-18%             27%  19-25%             18%  >25 %               9%  Other factors may help determine the actual risk of prostate cancer in  individual patients. The free PSA percentage is an aid in distinguishing prostate cancer  from benign prostatic conditions in men age 48 and older with a total  PSA between 3 and 10 ng/mL and negative digital rectal examination  findings. [address] [facility], [address] [place] [address] [phone]      Sodium 09/18/2020 134* 136 - 145 mmol/L Final    Potassium 09/18/2020 4.5  3.5 - 5.1 mmol/L Final    Chloride 09/18/2020 98* 99 - 110 mmol/L Final    CO2 09/18/2020 24  21 - 32 mmol/L Final    Anion Gap 09/18/2020 12  3 - 16 Final    Glucose 09/18/2020 186* 70 - 99 mg/dL Final    BUN 09/18/2020 10  7 - 20 mg/dL Final    CREATININE 09/18/2020 0.7* 0.9 - 1.3 mg/dL Final    GFR Non- 09/18/2020 >60  >60 Final    Comment: >60 mL/min/1.73m2 EGFR, calc. for ages 25 and older using the  MDRD formula (not corrected for weight), is valid for stable  renal function.  GFR  09/18/2020 >60  >60 Final    Comment: Chronic Kidney Disease: less than 60 ml/min/1.73 sq.m. Kidney Failure: less than 15 ml/min/1.73 sq.m. Results valid for patients 18 years and older.       Calcium 09/18/2020 9.6  8.3 - 10.6 mg/dL Final    Total Protein 09/18/2020 6.5  6.4 - 8.2 g/dL Final    Albumin 09/18/2020 4.3  3.4 - 5.0 g/dL Final    Albumin/Globulin Ratio 09/18/2020 2.0  1.1 - 2.2 Final    Total Bilirubin 09/18/2020 0.3  0.0 - 1.0 mg/dL Final    Alkaline Phosphatase 09/18/2020 96  40 - 129 U/L Final    ALT 09/18/2020 34  10 - 40 U/L Final    AST 09/18/2020 23  15 - 37 U/L Final    Globulin 09/18/2020 2.2  g/dL Final  LDL Direct 09/18/2020 74  <100 mg/dL Final     No family history on file. Current Outpatient Medications   Medication Sig Dispense Refill    TRULICITY 8.42 PS/3.2SZ SOPN ADMINISTER 0.5 ML UNDER THE SKIN 1 TIME A WEEK 2 mL 0    glipiZIDE (GLUCOTROL XL) 10 MG extended release tablet TAKE 1 TABLET DAILY 90 tablet 5    metFORMIN (GLUCOPHAGE-XR) 500 MG extended release tablet TAKE 4 TABLETS DAILY WITH BREAKFAST 360 tablet 3    Insulin Pen Needle (B-D UF III MINI PEN NEEDLES) 31G X 5 MM MISC USE AS DIRECTED WITH INSULIN 100 each 1    blood glucose test strips (ONE TOUCH ULTRA TEST) strip 1 each by In Vitro route daily USE AS DIRECTED DAILY and as needed 50 strip 3    irbesartan (AVAPRO) 150 MG tablet Take 1 tablet by mouth nightly 90 tablet 3    pioglitazone (ACTOS) 30 MG tablet Take 1 tablet by mouth daily 90 tablet 3    PARoxetine (PAXIL) 30 MG tablet TAKE 2 TABLETS BY MOUTH DAILY 180 tablet 3    omeprazole (PRILOSEC) 20 MG delayed release capsule Take 1 capsule by mouth daily 90 capsule 3    propranolol (INDERAL LA) 60 MG extended release capsule TAKE 1 CAPSULE BY MOUTH DAILY 90 capsule 3    rosuvastatin (CRESTOR) 10 MG tablet Take 1 tablet by mouth nightly 90 tablet 3    insulin glargine (LANTUS SOLOSTAR) 100 UNIT/ML injection pen Inject 80 Units into the skin nightly (Patient taking differently: Inject 20 Units into the skin nightly ) 45 pen 3     No current facility-administered medications for this visit.       Allergies   Allergen Reactions    Lisinopril Other (See Comments)     COUGH       Review of Systems    Objective:  /80 (Site: Left Upper Arm, Position: Sitting, Cuff Size: Medium Adult)   Temp 98 °F (36.7 °C) (Infrared)   Ht 5' 11\" (1.803 m)   Wt 210 lb (95.3 kg)   BMI 29.29 kg/m²     BP Readings from Last 3 Encounters:   03/02/21 122/80   08/27/20 124/80   03/25/20 130/74       Pulse Readings from Last 3 Encounters:   03/25/20 94   07/05/19 88   06/07/19 100 Wt Readings from Last 3 Encounters:   03/02/21 210 lb (95.3 kg)   08/27/20 204 lb 3.2 oz (92.6 kg)   03/25/20 197 lb (89.4 kg)       Physical Exam  Constitutional:       Appearance: Normal appearance. HENT:      Head: Normocephalic. Eyes:      Extraocular Movements: Extraocular movements intact. Cardiovascular:      Rate and Rhythm: Normal rate and regular rhythm. Pulmonary:      Effort: Pulmonary effort is normal.      Breath sounds: Normal breath sounds. Abdominal:      General: There is no distension. Palpations: Abdomen is soft. Tenderness: There is no abdominal tenderness. Neurological:      Mental Status: He is alert. Comments: Feet - clear - no deformity - some dry skin - sl erythema medial foot - numb on lateral part of feet bilat w/ dry skin. Assessment/Plan:   Emili Lucio was seen today for diabetes. Diagnoses and all orders for this visit:    Type 2 diabetes mellitus with diabetic neuropathy, unspecified whether long term insulin use (HCC)  -     POCT glycosylated hemoglobin (Hb A1C)    Generalized anxiety disorder       Diagnosis Orders   1. Type 2 diabetes mellitus with diabetic neuropathy, unspecified whether long term insulin use (HCC)  POCT glycosylated hemoglobin (Hb A1C)   2. Generalized anxiety disorder  PARoxetine (PAXIL) 30 MG tablet   3. Essential hypertension, benign     4. Mixed hyperlipidemia     5. Gastroesophageal reflux disease, unspecified whether esophagitis present       CONT CRESTOR FOR LIPIDS  BP STABLE ON INDERAL/ AVAPRO - TOLERATES WELL - ACEI INTOLERANCE  RENAL/ MICROALBUMIN STABLE  GERD STABLE - ON PRILOSEC 20/DAY  COVID VACCINE DW PT  ANXIETY DOING WELL OVERALL ON PAXIL/ INDERAL  EFFECT OF ISOLATION D/W PT - NO CHANGE IN DOSE FOR NOW  DIET/ ACTIVITY DW/ PT - INCREASE TRULICITY 9.87 TO 1.5 WEEKLY.   W/ GOAL OF REDUCING LANTUS AND POSSIBLY GLIPIZIDE ONCE A1C GETS DOWN BELOW 7  FEET/ CARE DW/ PT - UTD ON EYE EXAM. F/U ORAL SURGEON - RECORDS TO BE SENT  HIGH TG'S  UTD ON COLONOSCOPY  F/u in June w/ a1c  No follow-ups on file.     Del Stanton DO  3/2/2021  8:55 AM

## 2021-03-29 ENCOUNTER — PATIENT MESSAGE (OUTPATIENT)
Dept: FAMILY MEDICINE CLINIC | Age: 61
End: 2021-03-29

## 2021-03-29 RX ORDER — GLIPIZIDE 10 MG/1
10 TABLET, FILM COATED, EXTENDED RELEASE ORAL DAILY
Qty: 90 TABLET | Refills: 0 | Status: SHIPPED | OUTPATIENT
Start: 2021-03-29 | End: 2022-02-08

## 2021-03-29 RX ORDER — METFORMIN HYDROCHLORIDE 500 MG/1
500 TABLET, EXTENDED RELEASE ORAL
Qty: 360 TABLET | Refills: 0 | Status: SHIPPED | OUTPATIENT
Start: 2021-03-29 | End: 2022-02-08

## 2021-03-30 NOTE — TELEPHONE ENCOUNTER
Should have been precertified at [facility] [place] [address],[address] radiology. Let me know if I need to be more specific in dx code or anything else.

## 2021-05-11 RX ORDER — DULAGLUTIDE 0.75 MG/.5ML
0.75 INJECTION, SOLUTION SUBCUTANEOUS WEEKLY
Qty: 4 PEN | Refills: 0 | Status: SHIPPED | OUTPATIENT
Start: 2021-05-11 | End: 2021-06-02

## 2021-06-02 ENCOUNTER — OFFICE VISIT (OUTPATIENT)
Dept: FAMILY MEDICINE CLINIC | Age: 61
End: 2021-06-02
Payer: COMMERCIAL

## 2021-06-02 VITALS
WEIGHT: 205 LBS | DIASTOLIC BLOOD PRESSURE: 74 MMHG | BODY MASS INDEX: 28.7 KG/M2 | HEIGHT: 71 IN | SYSTOLIC BLOOD PRESSURE: 118 MMHG | RESPIRATION RATE: 16 BRPM | HEART RATE: 78 BPM

## 2021-06-02 DIAGNOSIS — E78.2 MIXED HYPERLIPIDEMIA: ICD-10-CM

## 2021-06-02 DIAGNOSIS — F41.9 ANXIETY: ICD-10-CM

## 2021-06-02 DIAGNOSIS — E11.40 TYPE 2 DIABETES MELLITUS WITH DIABETIC NEUROPATHY, UNSPECIFIED WHETHER LONG TERM INSULIN USE (HCC): Primary | ICD-10-CM

## 2021-06-02 DIAGNOSIS — I10 ESSENTIAL HYPERTENSION: ICD-10-CM

## 2021-06-02 DIAGNOSIS — E11.3299 MILD NONPROLIFERATIVE DIABETIC RETINOPATHY ASSOCIATED WITH TYPE 2 DIABETES MELLITUS, MACULAR EDEMA PRESENCE UNSPECIFIED, UNSPECIFIED LATERALITY (HCC): ICD-10-CM

## 2021-06-02 LAB — HBA1C MFR BLD: 7.9 %

## 2021-06-02 PROCEDURE — 3051F HG A1C>EQUAL 7.0%<8.0%: CPT | Performed by: FAMILY MEDICINE

## 2021-06-02 PROCEDURE — 99214 OFFICE O/P EST MOD 30 MIN: CPT | Performed by: FAMILY MEDICINE

## 2021-06-02 PROCEDURE — 83036 HEMOGLOBIN GLYCOSYLATED A1C: CPT | Performed by: FAMILY MEDICINE

## 2021-06-02 RX ORDER — DULAGLUTIDE 1.5 MG/.5ML
1.5 INJECTION, SOLUTION SUBCUTANEOUS WEEKLY
Qty: 4 PEN | Refills: 5 | Status: SHIPPED | OUTPATIENT
Start: 2021-06-02 | End: 2022-07-15

## 2021-06-02 RX ORDER — ONDANSETRON 4 MG/1
4 TABLET, ORALLY DISINTEGRATING ORAL EVERY 8 HOURS PRN
Qty: 20 TABLET | Refills: 0 | Status: SHIPPED | OUTPATIENT
Start: 2021-06-02 | End: 2021-07-05 | Stop reason: SDUPTHER

## 2021-06-02 SDOH — ECONOMIC STABILITY: TRANSPORTATION INSECURITY
IN THE PAST 12 MONTHS, HAS THE LACK OF TRANSPORTATION KEPT YOU FROM MEDICAL APPOINTMENTS OR FROM GETTING MEDICATIONS?: NO

## 2021-06-02 SDOH — ECONOMIC STABILITY: FOOD INSECURITY: WITHIN THE PAST 12 MONTHS, THE FOOD YOU BOUGHT JUST DIDN'T LAST AND YOU DIDN'T HAVE MONEY TO GET MORE.: NEVER TRUE

## 2021-06-02 SDOH — ECONOMIC STABILITY: TRANSPORTATION INSECURITY
IN THE PAST 12 MONTHS, HAS LACK OF TRANSPORTATION KEPT YOU FROM MEETINGS, WORK, OR FROM GETTING THINGS NEEDED FOR DAILY LIVING?: NO

## 2021-06-02 SDOH — ECONOMIC STABILITY: FOOD INSECURITY: WITHIN THE PAST 12 MONTHS, YOU WORRIED THAT YOUR FOOD WOULD RUN OUT BEFORE YOU GOT MONEY TO BUY MORE.: NEVER TRUE

## 2021-06-02 ASSESSMENT — SOCIAL DETERMINANTS OF HEALTH (SDOH): HOW HARD IS IT FOR YOU TO PAY FOR THE VERY BASICS LIKE FOOD, HOUSING, MEDICAL CARE, AND HEATING?: NOT HARD AT ALL

## 2021-06-02 NOTE — PROGRESS NOTES
[facility] Medicine  Clinic Note    Date: 6/2/2021                                               Subjective:     Chief Complaint   Patient presents with    Diabetes     3 MO DM ROUTINE FOLLOW UP AIC NEEDED      HPI  Last a1c 7.5% - today - 7.9%  Labs done 9/20  Eye exam 3/21 - mild retinopathy - reviewed note  Had nausea when increased trulicity 8.65 to 1.5 but okay on 0.75. Had teeth pulled - getting fitted for bottom teeth - diet has been up and down as well w/ teeth issue. Neuropathy fairly stable -   Had subungual hematoma under great toenail after doing yard work recently. Vision stable. lantus - 50u right now - adjusting it but overall a little lower.   Mood good overall - feels good generally       Patient Active Problem List    Diagnosis Date Noted    Vitamin D deficiency 04/20/2016    Major depression in remission ([address] 01/20/2016    Diabetic polyneuropathy associated with type 2 diabetes mellitus ([address] 10/20/2015    Diabetes mellitus type 2 in nonobese ([address] 10/20/2015    Diabetic neuropathy ([address] 02/04/2015    Polyneuropathy 02/04/2015    Skin lesion of face 01/16/2015    Type II or unspecified type diabetes mellitus without mention of complication, not stated as uncontrolled 10/10/2014    RLS (restless legs syndrome) 06/06/2014    KELSI (obstructive sleep apnea) 09/30/2011    Other abnormal glucose 07/18/2011    Pain in limb 07/18/2011    Anxiety state 07/18/2011    Actinic keratosis 07/18/2011    Impacted cerumen 07/18/2011    Abdominal pain 07/18/2011    Essential hypertension, benign 07/18/2011    Esophageal reflux 07/18/2011    Acute frontal sinusitis 07/18/2011    Other viral warts 07/18/2011    Generalized anxiety disorder 07/18/2011    Hyperlipidemia 07/18/2011    Diabetes mellitus ([address] 07/18/2011     Past Medical History:   Diagnosis Date    Diabetes mellitus ([address]     type II    Hypertension     Sleep apnea      Past Surgical History:   Procedure Laterality Date    CHOLECYSTECTOMY 2010     Office Visit on 03/02/2021   Component Date Value Ref Range Status    Hemoglobin A1C 03/02/2021 7.5  % Final     No family history on file. Current Outpatient Medications   Medication Sig Dispense Refill    Dulaglutide (TRULICITY) 0.34 AS/7.0AW SOPN Inject 0.75 mg into the skin once a week 4 pen 0    metFORMIN (GLUCOPHAGE-XR) 500 MG extended release tablet Take 1 tablet by mouth daily (with breakfast) TAKE 4 TABLETS DAILY WITH BREAKFAST 360 tablet 0    glipiZIDE (GLUCOTROL XL) 10 MG extended release tablet Take 1 tablet by mouth daily 90 tablet 0    pioglitazone (ACTOS) 30 MG tablet Take 1 tablet by mouth daily 90 tablet 3    PARoxetine (PAXIL) 30 MG tablet TAKE 2 TABLETS BY MOUTH DAILY 180 tablet 3    irbesartan (AVAPRO) 150 MG tablet Take 1 tablet by mouth nightly 90 tablet 3    omeprazole (PRILOSEC) 20 MG delayed release capsule Take 1 capsule by mouth daily 90 capsule 3    propranolol (INDERAL LA) 60 MG extended release capsule TAKE 1 CAPSULE BY MOUTH DAILY 90 capsule 3    rosuvastatin (CRESTOR) 10 MG tablet Take 1 tablet by mouth nightly 90 tablet 3    Insulin Pen Needle (B-D UF III MINI PEN NEEDLES) 31G X 5 MM MISC USE AS DIRECTED WITH INSULIN 100 each 1    blood glucose test strips (ONE TOUCH ULTRA TEST) strip 1 each by In Vitro route daily USE AS DIRECTED DAILY and as needed 50 strip 3    insulin glargine (LANTUS SOLOSTAR) 100 UNIT/ML injection pen Inject 80 Units into the skin nightly (Patient taking differently: Inject 20 Units into the skin nightly ) 45 pen 3     No current facility-administered medications for this visit.      Allergies   Allergen Reactions    Lisinopril Other (See Comments)     COUGH       Review of Systems    Objective:   5' 11\" (1.803 m)   BMI 29.29 kg/m²     BP Readings from Last 3 Encounters:   06/02/21 118/74   03/02/21 122/80   08/27/20 124/80         Pulse Readings from Last 3 Encounters:   06/02/21 78   03/25/20 94   07/05/19 88         Wt Readings from Last 3 Encounters:   06/02/21 205 lb (93 kg)   03/02/21 210 lb (95.3 kg)   08/27/20 204 lb 3.2 oz (92.6 kg)       Physical Exam  Constitutional:       General: He is not in acute distress. Appearance: He is well-developed. Eyes:      General: No scleral icterus. Conjunctiva/sclera: Conjunctivae normal.   Cardiovascular:      Rate and Rhythm: Normal rate and regular rhythm. Heart sounds: Normal heart sounds. No murmur heard. No gallop. Pulmonary:      Effort: Pulmonary effort is normal. No respiratory distress. Breath sounds: Normal breath sounds. No wheezing, rhonchi or rales. Abdominal:      General: Bowel sounds are normal. There is no distension. Palpations: Abdomen is soft. Tenderness: There is no abdominal tenderness. Musculoskeletal:         General: No swelling. Skin:     Findings: No erythema or rash. Neurological:      Mental Status: He is alert. Assessment/Plan:      Diagnosis Orders   1. Type 2 diabetes mellitus with diabetic neuropathy, unspecified whether long term insulin use (HCC)  POCT glycosylated hemoglobin (Hb A1C)   2. Anxiety     3. Essential hypertension     4. Mixed hyperlipidemia     5.  Mild nonproliferative diabetic retinopathy associated with type 2 diabetes mellitus, macular edema presence unspecified, unspecified laterality ([facility]     utd on eye exam  Feet care dw pt  Mood stable on paxil/ inderal - no changes at this time in regimen  covid vaccine soon encouraged  Cont crestor for lipids - recheck lipids et al labs on f/u 3 months  bp seems controlled on inderal/ avapro  congrats on diet/ activity  a1c up to 7.9% - cont glipizide/ metformin  Increase trulicity 3.06 to 1.5 w/ nausea med to see if can get adjusted - zofran rx  If not able to tolerate, will consider sglt2i or different glp-1 agonist   Adjust lantus to goal am fbs <130  Diet/ activity dw/ pt  Dale Casanova MD, MD  6/2/2021  8:08 AM

## 2021-09-28 ENCOUNTER — PATIENT MESSAGE (OUTPATIENT)
Dept: FAMILY MEDICINE CLINIC | Age: 61
End: 2021-09-28

## 2021-09-28 NOTE — TELEPHONE ENCOUNTER
I DO NOT SEE A PHYSICAL SCHED I SEE HIS DM FOLLOW [employer]? MARTY HAD AN APPT ON 8/4 BUT ALSO NOT CODED AS A PHYSICAL. I CANNOT DO A PHYSICAL BEFORE 9/30.  Ruben Li

## 2021-09-28 NOTE — TELEPHONE ENCOUNTER
From: Emil Olsen  To: Mary Kerns MD  Sent: 9/28/2021 5:12 PM EDT  Subject: Non-Urgent Medical Question    Good Afternoon ,     My work is wanting this health assessment form filled out , for both Albina Savage and I , information can be used back to 12/2020, please let me know if you can fill this form out for me , please let me know.  they are asking for this back 9/30/2021    Thank Javier Blanton  [phone] Cell

## 2021-09-29 ENCOUNTER — PATIENT MESSAGE (OUTPATIENT)
Dept: FAMILY MEDICINE CLINIC | Age: 61
End: 2021-09-29

## 2021-09-29 NOTE — TELEPHONE ENCOUNTER
Yes. Can count august visit w/ pasquale as annual exam as well as physical exam was done and labs ordered

## 2021-09-29 NOTE — TELEPHONE ENCOUNTER
Since I did a physical exam and addressed number of problems at last visit, should be okay to complete form

## 2021-09-30 ENCOUNTER — TELEPHONE (OUTPATIENT)
Dept: FAMILY MEDICINE CLINIC | Age: 61
End: 2021-09-30

## 2021-09-30 NOTE — TELEPHONE ENCOUNTER
WILL ROUTE TO CARLOS TRAVIS WHEN SHE GETS IN - LOOKS LIKE THIS IS IN REGARD TO A CALL FROM YESTERDAY. IS HE SUPPOSED TO GET LABS TODAY? ? THERE ARE NO APPTS? ?

## 2021-09-30 NOTE — TELEPHONE ENCOUNTER
SENT A [employer] MESSAGE PT DID NOT NEED TO TALK TO ME HE NEEDED TO JUST SEE IF WE COULD GET ORDERS PLACED OR SQUEEZE HIM IN FOR LABS TODAY. Beatrice South

## 2021-09-30 NOTE — TELEPHONE ENCOUNTER
From: Ania Vick  To: Zenia Turner MD  Sent: 9/29/2021 7:34 PM EDT  Subject: Visit Follow-Up Question    i will call you in the morning, this is Shantelle's Form    Thank Sarah Roberts

## 2021-11-12 ENCOUNTER — IMMUNIZATION (OUTPATIENT)
Dept: FAMILY MEDICINE CLINIC | Age: 61
End: 2021-11-12
Payer: COMMERCIAL

## 2021-11-12 DIAGNOSIS — Z23 NEED FOR IMMUNIZATION AGAINST INFLUENZA: Primary | ICD-10-CM

## 2021-11-12 PROCEDURE — 90674 CCIIV4 VAC NO PRSV 0.5 ML IM: CPT | Performed by: FAMILY MEDICINE

## 2021-11-12 PROCEDURE — 90471 IMMUNIZATION ADMIN: CPT | Performed by: FAMILY MEDICINE

## 2021-11-12 NOTE — PROGRESS NOTES
Vaccine Information Sheet, \"Influenza - Inactivated\"  given to Elis Moraes, or parent/legal guardian of  Elis Moraes and verbalized understanding. Patient responses:    Have you ever had a reaction to a flu vaccine? No  Do you have any current illness? No  Have you ever had Guillian [place] Syndrome? No  Do you have a serious allergy to any of the follow: Neomycin, Polymyxin, Thimerosal, eggs or egg products? No    Flu vaccine given per order. Please see immunization tab. Risks and benefits explained. Current VIS given.       Immunizations Administered     Name Date Dose Route    Influenza, MDCK Quadv, IM, PF (Flucelvax 2 yrs and older) 11/12/2021 0.5 mL Intramuscular    Site: Deltoid- Left    Lot: [id number]    [address]: 92137-184-77

## 2022-02-08 RX ORDER — METFORMIN HYDROCHLORIDE 500 MG/1
TABLET, EXTENDED RELEASE ORAL
Qty: 360 TABLET | Refills: 0 | Status: SHIPPED | OUTPATIENT
Start: 2022-02-08 | End: 2022-04-21

## 2022-02-08 RX ORDER — GLIPIZIDE 10 MG/1
TABLET, FILM COATED, EXTENDED RELEASE ORAL
Qty: 90 TABLET | Refills: 0 | Status: SHIPPED | OUTPATIENT
Start: 2022-02-08 | End: 2022-04-21

## 2022-03-16 NOTE — TELEPHONE ENCOUNTER
[facility] REQUESTING NEW SCRIPTS    MYCHART MESSAGE SENT FOR PT TO SCHEDULE ROUTINE F/U HTN DM

## 2022-03-21 RX ORDER — IRBESARTAN 150 MG/1
150 TABLET ORAL NIGHTLY
Qty: 90 TABLET | Refills: 0 | Status: SHIPPED | OUTPATIENT
Start: 2022-03-21 | End: 2022-08-08 | Stop reason: SDUPTHER

## 2022-03-21 RX ORDER — ROSUVASTATIN CALCIUM 10 MG/1
10 TABLET, COATED ORAL NIGHTLY
Qty: 90 TABLET | Refills: 0 | Status: SHIPPED | OUTPATIENT
Start: 2022-03-21 | End: 2022-08-08 | Stop reason: SDUPTHER

## 2022-04-08 ENCOUNTER — OFFICE VISIT (OUTPATIENT)
Dept: FAMILY MEDICINE CLINIC | Age: 62
End: 2022-04-08
Payer: COMMERCIAL

## 2022-04-08 VITALS
RESPIRATION RATE: 18 BRPM | HEIGHT: 71 IN | WEIGHT: 199 LBS | SYSTOLIC BLOOD PRESSURE: 128 MMHG | HEART RATE: 86 BPM | DIASTOLIC BLOOD PRESSURE: 82 MMHG | BODY MASS INDEX: 27.86 KG/M2

## 2022-04-08 DIAGNOSIS — E11.40 TYPE 2 DIABETES MELLITUS WITH DIABETIC NEUROPATHY, UNSPECIFIED WHETHER LONG TERM INSULIN USE (HCC): Primary | ICD-10-CM

## 2022-04-08 DIAGNOSIS — E11.42 DIABETIC POLYNEUROPATHY ASSOCIATED WITH TYPE 2 DIABETES MELLITUS (HCC): ICD-10-CM

## 2022-04-08 DIAGNOSIS — E55.9 VITAMIN D DEFICIENCY: ICD-10-CM

## 2022-04-08 DIAGNOSIS — F41.1 GENERALIZED ANXIETY DISORDER: ICD-10-CM

## 2022-04-08 DIAGNOSIS — I10 ESSENTIAL HYPERTENSION, BENIGN: ICD-10-CM

## 2022-04-08 DIAGNOSIS — Z12.5 PROSTATE CANCER SCREENING: ICD-10-CM

## 2022-04-08 DIAGNOSIS — E78.2 MIXED HYPERLIPIDEMIA: Chronic | ICD-10-CM

## 2022-04-08 DIAGNOSIS — G47.33 OSA (OBSTRUCTIVE SLEEP APNEA): ICD-10-CM

## 2022-04-08 DIAGNOSIS — F32.5 MAJOR DEPRESSION IN REMISSION (HCC): ICD-10-CM

## 2022-04-08 LAB
A/G RATIO: 1.7 (ref 1.1–2.2)
ALBUMIN SERPL-MCNC: 4 G/DL (ref 3.4–5)
ALP BLD-CCNC: 112 U/L (ref 40–129)
ALT SERPL-CCNC: 33 U/L (ref 10–40)
ANION GAP SERPL CALCULATED.3IONS-SCNC: 18 MMOL/L (ref 3–16)
AST SERPL-CCNC: 22 U/L (ref 15–37)
BILIRUB SERPL-MCNC: 0.3 MG/DL (ref 0–1)
BUN BLDV-MCNC: 11 MG/DL (ref 7–20)
CALCIUM SERPL-MCNC: 8.9 MG/DL (ref 8.3–10.6)
CHLORIDE BLD-SCNC: 94 MMOL/L (ref 99–110)
CHOLESTEROL, TOTAL: 313 MG/DL (ref 0–199)
CO2: 19 MMOL/L (ref 21–32)
CREAT SERPL-MCNC: 0.7 MG/DL (ref 0.9–1.3)
CREATININE URINE POCT: 100
GFR AFRICAN AMERICAN: >60
GFR NON-AFRICAN AMERICAN: >60
GLUCOSE BLD-MCNC: 437 MG/DL (ref 70–99)
HBA1C MFR BLD: 13.2 %
HDLC SERPL-MCNC: 26 MG/DL (ref 40–60)
LDL CHOLESTEROL CALCULATED: ABNORMAL MG/DL
MICROALBUMIN/CREAT 24H UR: 10 MG/G{CREAT}
MICROALBUMIN/CREAT UR-RTO: <30
POTASSIUM SERPL-SCNC: 4.5 MMOL/L (ref 3.5–5.1)
PROSTATE SPECIFIC ANTIGEN: 0.77 NG/ML (ref 0–4)
SODIUM BLD-SCNC: 131 MMOL/L (ref 136–145)
TOTAL PROTEIN: 6.3 G/DL (ref 6.4–8.2)
TRIGL SERPL-MCNC: 1723 MG/DL (ref 0–150)
VITAMIN D 25-HYDROXY: 10.4 NG/ML
VLDLC SERPL CALC-MCNC: ABNORMAL MG/DL

## 2022-04-08 PROCEDURE — 83036 HEMOGLOBIN GLYCOSYLATED A1C: CPT | Performed by: FAMILY MEDICINE

## 2022-04-08 PROCEDURE — 82044 UR ALBUMIN SEMIQUANTITATIVE: CPT | Performed by: FAMILY MEDICINE

## 2022-04-08 PROCEDURE — 99214 OFFICE O/P EST MOD 30 MIN: CPT | Performed by: FAMILY MEDICINE

## 2022-04-08 RX ORDER — OMEPRAZOLE 20 MG/1
20 CAPSULE, DELAYED RELEASE ORAL DAILY
Qty: 90 CAPSULE | Refills: 3 | Status: SHIPPED | OUTPATIENT
Start: 2022-04-08 | End: 2022-08-08 | Stop reason: SDUPTHER

## 2022-04-08 RX ORDER — PROPRANOLOL HCL 60 MG
CAPSULE, EXTENDED RELEASE 24HR ORAL
Qty: 90 CAPSULE | Refills: 3 | Status: SHIPPED | OUTPATIENT
Start: 2022-04-08 | End: 2022-08-08 | Stop reason: SDUPTHER

## 2022-04-08 RX ORDER — PAROXETINE 30 MG/1
TABLET, FILM COATED ORAL
Qty: 180 TABLET | Refills: 3 | Status: SHIPPED | OUTPATIENT
Start: 2022-04-08 | End: 2022-08-08 | Stop reason: SDUPTHER

## 2022-04-08 RX ORDER — PIOGLITAZONEHYDROCHLORIDE 30 MG/1
30 TABLET ORAL DAILY
Qty: 90 TABLET | Refills: 3 | Status: SHIPPED | OUTPATIENT
Start: 2022-04-08 | End: 2022-07-15

## 2022-04-08 RX ORDER — INSULIN LISPRO 100 [IU]/ML
INJECTION, SOLUTION INTRAVENOUS; SUBCUTANEOUS
Qty: 5 EACH | Refills: 5 | Status: SHIPPED | OUTPATIENT
Start: 2022-04-08 | End: 2022-07-15

## 2022-04-08 ASSESSMENT — PATIENT HEALTH QUESTIONNAIRE - PHQ9
5. POOR APPETITE OR OVEREATING: 1
8. MOVING OR SPEAKING SO SLOWLY THAT OTHER PEOPLE COULD HAVE NOTICED. OR THE OPPOSITE, BEING SO FIGETY OR RESTLESS THAT YOU HAVE BEEN MOVING AROUND A LOT MORE THAN USUAL: 1
4. FEELING TIRED OR HAVING LITTLE ENERGY: 1
SUM OF ALL RESPONSES TO PHQ QUESTIONS 1-9: 10
SUM OF ALL RESPONSES TO PHQ QUESTIONS 1-9: 10
9. THOUGHTS THAT YOU WOULD BE BETTER OFF DEAD, OR OF HURTING YOURSELF: 0
1. LITTLE INTEREST OR PLEASURE IN DOING THINGS: 2
6. FEELING BAD ABOUT YOURSELF - OR THAT YOU ARE A FAILURE OR HAVE LET YOURSELF OR YOUR FAMILY DOWN: 1
10. IF YOU CHECKED OFF ANY PROBLEMS, HOW DIFFICULT HAVE THESE PROBLEMS MADE IT FOR YOU TO DO YOUR WORK, TAKE CARE OF THINGS AT HOME, OR GET ALONG WITH OTHER PEOPLE: 1
SUM OF ALL RESPONSES TO PHQ9 QUESTIONS 1 & 2: 3
3. TROUBLE FALLING OR STAYING ASLEEP: 2
SUM OF ALL RESPONSES TO PHQ QUESTIONS 1-9: 10
7. TROUBLE CONCENTRATING ON THINGS, SUCH AS READING THE NEWSPAPER OR WATCHING TELEVISION: 1
SUM OF ALL RESPONSES TO PHQ QUESTIONS 1-9: 10
2. FEELING DOWN, DEPRESSED OR HOPELESS: 1

## 2022-04-08 NOTE — PROGRESS NOTES
[facility]  Clinic Note    Date: 4/8/2022                                               Subjective:     Chief Complaint   Patient presents with    Diabetes     DM ROUTINE FOLLOW UP AIC MICRO      HPI    Last a1c 7.9% - 10 months ago - today 19.0% - stopped trulicity as a lot of nausea and diarrhea - tolerated low dose but not 1.5mg dose  Didn't use 80u lantus for awhile but fairly regular since 11/21  Tolerates that and pills okay. - on metformin 2g/ glipizide 10/ actos 30  Feeling okay now  Fasting for labs today  Na 134 o/w cmp okay besides high glucose  Uric acid 6.9/ psa 1.1  Lipid tg 345, hdl 35, ldl 71  More mood swings lately - no major stressors  Home a lot more - not getting out as much - used to be on go - still doing 2nd job but cut back -working every other weekend. Has good days/ bad days. Doesn't want to bump up paxil - works through mood okay  Sleep never great - tired a lot of time/ out of sorts. Feels good otherwise - lack of drive at times. Motivation not always real good.   Diet not quite as good as in past and not as active   No change in vision - due for vision check - 1 year ago  More tingling in tips of fingers lately  Colonoscopy done 2019  BP Readings from Last 3 Encounters:   04/08/22 128/82   06/02/21 118/74   03/02/21 122/80     Pulse Readings from Last 3 Encounters:   04/08/22 86   06/02/21 78   03/25/20 94     Wt Readings from Last 3 Encounters:   04/08/22 199 lb (90.3 kg)   06/02/21 205 lb (93 kg)   03/02/21 210 lb (95.3 kg)            Patient Active Problem List    Diagnosis Date Noted    Vitamin D deficiency 04/20/2016    Major depression in remission ([facility] [address] 01/20/2016    Diabetic polyneuropathy associated with type 2 diabetes mellitus ([facility] [address] 10/20/2015    Diabetes mellitus type 2 in nonobese ([facility] [address] 10/20/2015    Diabetic neuropathy ([facility] [address] 02/04/2015    Polyneuropathy 02/04/2015    Skin lesion of face 01/16/2015    Type II or unspecified type diabetes mellitus without mention of complication, not stated as uncontrolled 10/10/2014    RLS (restless legs syndrome) 06/06/2014    KELSI (obstructive sleep apnea) 09/30/2011    Other abnormal glucose 07/18/2011    Pain in limb 07/18/2011    Anxiety state 07/18/2011    Actinic keratosis 07/18/2011    Impacted cerumen 07/18/2011    Abdominal pain 07/18/2011    Essential hypertension, benign 07/18/2011    Esophageal reflux 07/18/2011    Acute frontal sinusitis 07/18/2011    Other viral warts 07/18/2011    Generalized anxiety disorder 07/18/2011    Hyperlipidemia 07/18/2011    Diabetes mellitus ([facility] [address] 07/18/2011     Past Medical History:   Diagnosis Date    Diabetes mellitus ([facility] [address]     type II    Hypertension     Sleep apnea      Past Surgical History:   Procedure Laterality Date    CHOLECYSTECTOMY  2010     Office Visit on 06/02/2021   Component Date Value Ref Range Status    Hemoglobin A1C 06/02/2021 7.9  % Final     No family history on file.   Current Outpatient Medications   Medication Sig Dispense Refill    irbesartan (AVAPRO) 150 MG tablet Take 1 tablet by mouth nightly 90 tablet 0    rosuvastatin (CRESTOR) 10 MG tablet Take 1 tablet by mouth nightly 90 tablet 0    glipiZIDE (GLUCOTROL XL) 10 MG extended release tablet TAKE 1 TABLET DAILY 90 tablet 0    metFORMIN (GLUCOPHAGE-XR) 500 MG extended release tablet TAKE 4 TABLETS DAILY WITH BREAKFAST 360 tablet 0    Insulin Pen Needle (B-D UF III MINI PEN NEEDLES) 31G X 5 MM MISC USE AS DIRECTED WITH INSULIN 100 each 1    blood glucose test strips (ONE TOUCH ULTRA TEST) strip 1 each by In Vitro route daily USE AS DIRECTED DAILY and as needed 50 strip 1    Dulaglutide (TRULICITY) 1.5 XO/2.1ZD SOPN Inject 1.5 mg into the skin once a week 4 pen 5    pioglitazone (ACTOS) 30 MG tablet Take 1 tablet by mouth daily 90 tablet 3    PARoxetine (PAXIL) 30 MG tablet TAKE 2 TABLETS BY MOUTH DAILY 180 tablet 3    omeprazole (PRILOSEC) 20 MG delayed release capsule Take 1 capsule by mouth daily 90 capsule 3    propranolol (INDERAL LA) 60 MG extended release capsule TAKE 1 CAPSULE BY MOUTH DAILY 90 capsule 3    insulin glargine (LANTUS SOLOSTAR) 100 UNIT/ML injection pen Inject 80 Units into the skin nightly (Patient taking differently: Inject 20 Units into the skin nightly ) 45 pen 3    ondansetron (ZOFRAN ODT) 4 MG disintegrating tablet Take 1 tablet by mouth every 8 hours as needed for Nausea or Vomiting 20 tablet 1     No current facility-administered medications for this visit. Allergies   Allergen Reactions    Lisinopril Other (See Comments)     COUGH       Review of Systems    Objective:  /82 (Site: Left Upper Arm, Position: Sitting, Cuff Size: Medium Adult)   Pulse 86   Resp 18   Ht 5' 11\" (1.803 m)   Wt 199 lb (90.3 kg)   BMI 27.75 kg/m²     BP Readings from Last 3 Encounters:   04/08/22 128/82   06/02/21 118/74   03/02/21 122/80       Pulse Readings from Last 3 Encounters:   04/08/22 86   06/02/21 78   03/25/20 94       Wt Readings from Last 3 Encounters:   04/08/22 199 lb (90.3 kg)   06/02/21 205 lb (93 kg)   03/02/21 210 lb (95.3 kg)       Physical Exam  Constitutional:       General: He is not in acute distress. Appearance: He is well-developed. Eyes:      General: No scleral icterus. Conjunctiva/sclera: Conjunctivae normal.   Cardiovascular:      Rate and Rhythm: Normal rate and regular rhythm. Heart sounds: Normal heart sounds. No murmur heard. No gallop. Pulmonary:      Effort: Pulmonary effort is normal. No respiratory distress. Breath sounds: Normal breath sounds. No wheezing, rhonchi or rales. Abdominal:      General: Bowel sounds are normal. There is no distension. Palpations: Abdomen is soft. Tenderness: There is no abdominal tenderness. Skin:     Findings: No erythema or rash. Neurological:      Mental Status: He is alert.          Assessment/Plan:     Type 2 diabetes mellitus with diabetic neuropathy, unspecified whether long term insulin use (HCC)  Generalized anxiety disorder  The following orders have not been finalized:  -     PARoxetine (PAXIL) 30 MG tablet  Essential hypertension, benign  Diabetic polyneuropathy associated with type 2 diabetes mellitus (HCC)  Mixed hyperlipidemia  KELSI (obstructive sleep apnea)  Vitamin D deficiency  Major depression in remission ([facility] [address]   bp stable on avapro/ inderal  Cont statin  Fasting labs soon  bs not controlled   Routine eye exam / feet care dw/pt  Diet/ activity dw/ pt  Start humalog 5 plus 2u for every 50 over 150 plus 3-4 for high cho meal at dinner  Adjustments based on pp bs d/w pt  Consider bump lantus to 45 bid and adjust to am bs <130  Start farxiga 10 daily - se dw/ pt  Cont ppi daily -working well - consider every other day trial  F/u 3 months/ prn sooner  Mood fairly stable on paxil - refill done  microalbumin  Brooke Eng MD, MD  4/8/2022  7:48 AM

## 2022-04-09 LAB — LDL CHOLESTEROL DIRECT: 77 MG/DL

## 2022-04-15 ENCOUNTER — TELEPHONE (OUTPATIENT)
Dept: ADMINISTRATIVE | Age: 62
End: 2022-04-15

## 2022-04-15 RX ORDER — OMEGA-3-ACID ETHYL ESTERS 1 G/1
2 CAPSULE, LIQUID FILLED ORAL 2 TIMES DAILY
Qty: 120 CAPSULE | Refills: 5 | Status: SHIPPED | OUTPATIENT
Start: 2022-04-15

## 2022-04-21 RX ORDER — GLIPIZIDE 10 MG/1
TABLET, FILM COATED, EXTENDED RELEASE ORAL
Qty: 90 TABLET | Refills: 1 | Status: SHIPPED | OUTPATIENT
Start: 2022-04-21 | End: 2022-07-15

## 2022-04-21 RX ORDER — METFORMIN HYDROCHLORIDE 500 MG/1
TABLET, EXTENDED RELEASE ORAL
Qty: 360 TABLET | Refills: 1 | Status: SHIPPED | OUTPATIENT
Start: 2022-04-21 | End: 2022-10-18

## 2022-04-27 ENCOUNTER — PATIENT MESSAGE (OUTPATIENT)
Dept: FAMILY MEDICINE CLINIC | Age: 62
End: 2022-04-27

## 2022-04-27 DIAGNOSIS — E11.9 TYPE 2 DIABETES MELLITUS WITHOUT COMPLICATION, WITHOUT LONG-TERM CURRENT USE OF INSULIN (HCC): ICD-10-CM

## 2022-04-27 RX ORDER — INSULIN GLARGINE 100 [IU]/ML
80 INJECTION, SOLUTION SUBCUTANEOUS NIGHTLY
Qty: 45 PEN | Refills: 3 | Status: CANCELLED | OUTPATIENT
Start: 2022-04-27 | End: 2022-07-26

## 2022-04-27 RX ORDER — INSULIN GLARGINE-YFGN 100 [IU]/ML
INJECTION, SOLUTION SUBCUTANEOUS
Qty: 45 ML | Refills: 3 | Status: SHIPPED | OUTPATIENT
Start: 2022-04-27

## 2022-04-27 NOTE — TELEPHONE ENCOUNTER
From: Zahraa Devlin  To: Dr. Bains Calender: 4/27/2022 3:46 PM EDT  Subject: Need Prescription     Please send [facility] , prescription for Lantus Solostar insulin 90day supply , they do not have this on file , thank you Mesha Sinclair

## 2022-05-02 ENCOUNTER — PATIENT MESSAGE (OUTPATIENT)
Dept: FAMILY MEDICINE CLINIC | Age: 62
End: 2022-05-02

## 2022-05-03 NOTE — TELEPHONE ENCOUNTER
From: Ania Vick  To: Dr. aRe Medicine: 5/2/2022 6:39 PM EDT  Subject: [facility]    they are needing information before they release the lantus solostar

## 2022-05-04 ENCOUNTER — TELEPHONE (OUTPATIENT)
Dept: FAMILY MEDICINE CLINIC | Age: 62
End: 2022-05-04

## 2022-07-15 ENCOUNTER — OFFICE VISIT (OUTPATIENT)
Dept: FAMILY MEDICINE CLINIC | Age: 62
End: 2022-07-15
Payer: COMMERCIAL

## 2022-07-15 VITALS
DIASTOLIC BLOOD PRESSURE: 72 MMHG | WEIGHT: 203 LBS | BODY MASS INDEX: 28.42 KG/M2 | HEIGHT: 71 IN | SYSTOLIC BLOOD PRESSURE: 126 MMHG

## 2022-07-15 DIAGNOSIS — E11.9 TYPE 2 DIABETES MELLITUS WITHOUT COMPLICATION, WITHOUT LONG-TERM CURRENT USE OF INSULIN (HCC): Primary | ICD-10-CM

## 2022-07-15 DIAGNOSIS — I10 ESSENTIAL HYPERTENSION, BENIGN: ICD-10-CM

## 2022-07-15 DIAGNOSIS — E11.42 DIABETIC POLYNEUROPATHY ASSOCIATED WITH TYPE 2 DIABETES MELLITUS (HCC): ICD-10-CM

## 2022-07-15 DIAGNOSIS — F32.5 MAJOR DEPRESSION IN REMISSION (HCC): ICD-10-CM

## 2022-07-15 DIAGNOSIS — G47.33 OSA (OBSTRUCTIVE SLEEP APNEA): ICD-10-CM

## 2022-07-15 DIAGNOSIS — F41.1 ANXIETY STATE: Chronic | ICD-10-CM

## 2022-07-15 DIAGNOSIS — E55.9 VITAMIN D DEFICIENCY: ICD-10-CM

## 2022-07-15 DIAGNOSIS — G25.81 RLS (RESTLESS LEGS SYNDROME): ICD-10-CM

## 2022-07-15 LAB — HBA1C MFR BLD: 14 %

## 2022-07-15 PROCEDURE — 99214 OFFICE O/P EST MOD 30 MIN: CPT | Performed by: FAMILY MEDICINE

## 2022-07-15 PROCEDURE — 83036 HEMOGLOBIN GLYCOSYLATED A1C: CPT | Performed by: FAMILY MEDICINE

## 2022-07-15 PROCEDURE — 3046F HEMOGLOBIN A1C LEVEL >9.0%: CPT | Performed by: FAMILY MEDICINE

## 2022-07-15 RX ORDER — DULAGLUTIDE 0.75 MG/.5ML
0.75 INJECTION, SOLUTION SUBCUTANEOUS WEEKLY
Qty: 4 PEN | Refills: 5 | Status: SHIPPED | OUTPATIENT
Start: 2022-07-15 | End: 2022-08-08 | Stop reason: SDUPTHER

## 2022-07-15 SDOH — ECONOMIC STABILITY: FOOD INSECURITY: WITHIN THE PAST 12 MONTHS, THE FOOD YOU BOUGHT JUST DIDN'T LAST AND YOU DIDN'T HAVE MONEY TO GET MORE.: NEVER TRUE

## 2022-07-15 SDOH — ECONOMIC STABILITY: FOOD INSECURITY: WITHIN THE PAST 12 MONTHS, YOU WORRIED THAT YOUR FOOD WOULD RUN OUT BEFORE YOU GOT MONEY TO BUY MORE.: NEVER TRUE

## 2022-07-15 ASSESSMENT — SOCIAL DETERMINANTS OF HEALTH (SDOH): HOW HARD IS IT FOR YOU TO PAY FOR THE VERY BASICS LIKE FOOD, HOUSING, MEDICAL CARE, AND HEATING?: NOT HARD AT ALL

## 2022-07-15 NOTE — PROGRESS NOTES
[facility]  Clinic Note    Date: 7/15/2022                                               Subjective:     Chief Complaint   Patient presents with    Diabetes     3 MO DM ROUTINE FOLLOW UP AIC DUE      Diabetes    Not very compliant w/ meds per pt  Last month - travelling a lot -forgot medicine 1 whole week  Falling into lazy habits -not taking insulin/ forgetting morning pills  Uses mediset for pills  Can't give good reason - lazy - is only excuse  No trulicity use. Labs 4/22 reviewed  Last a1c up from 7's to 13.2 - today >14%  Na 131, co2 19 w/ normal GFR  Vit D 10  TG 1713  Vision  Neuropathy  On lantus 80 = increased  On metformin 2g/ glipizide 44/ actos 45/ trulicity 1.5 =  started humalog and farxiga at last visit - had some nausea/ diarrhea w/ bump in dose of trulicity 4.64 to 1.5.     On paxil for mood  BP Readings from Last 3 Encounters:   04/08/22 128/82   06/02/21 118/74   03/02/21 122/80     Pulse Readings from Last 3 Encounters:   04/08/22 86   06/02/21 78   03/25/20 94       Wt Readings from Last 3 Encounters:   07/15/22 203 lb (92.1 kg)   04/08/22 199 lb (90.3 kg)   06/02/21 205 lb (93 kg)            Patient Active Problem List    Diagnosis Date Noted    Vitamin D deficiency 04/20/2016    Major depression in remission ([facility] [address] 01/20/2016    Diabetic polyneuropathy associated with type 2 diabetes mellitus ([facility] [address] 10/20/2015    Diabetes mellitus type 2 in nonobese ([facility] [address] 10/20/2015    Diabetic neuropathy ([facility] [address] 02/04/2015    Skin lesion of face 01/16/2015    Type II or unspecified type diabetes mellitus without mention of complication, not stated as uncontrolled 10/10/2014    RLS (restless legs syndrome) 06/06/2014    KELSI (obstructive sleep apnea) 09/30/2011    Pain in limb 07/18/2011    Anxiety state 07/18/2011    Actinic keratosis 07/18/2011    Impacted cerumen 07/18/2011    Abdominal pain 07/18/2011    Essential hypertension, benign 07/18/2011    Esophageal reflux 07/18/2011    Acute frontal sinusitis 07/18/2011    Other viral warts 07/18/2011    Generalized anxiety disorder 07/18/2011    Hyperlipidemia 07/18/2011    Diabetes mellitus ([facility] [address] 07/18/2011     Past Medical History:   Diagnosis Date    Diabetes mellitus ([facility]     type II    Hypertension     Sleep apnea      Past Surgical History:   Procedure Laterality Date    CHOLECYSTECTOMY  2010     Office Visit on 04/08/2022   Component Date Value Ref Range Status    Hemoglobin A1C 04/08/2022 13.2  % Final    Microalb, Ur 04/08/2022 10   Final    Creatinine Ur POCT 04/08/2022 100   Final    Microalbumin Creatinine Ratio 04/08/2022 <30   Final    PSA 04/08/2022 0.77  0.00 - 4.00 ng/mL Final    Cholesterol, Total 04/08/2022 313 (A) 0 - 199 mg/dL Final    Triglycerides 04/08/2022 1,723 (A) 0 - 150 mg/dL Final    HDL 04/08/2022 26 (A) 40 - 60 mg/dL Final    LDL Calculated 04/08/2022 see below  <100 mg/dL Final    Comment: When the triglyceride is <30 mg/dL or >300 mg/dL the  calculated LDL and  VLDL are not valid and a measured  LDL is performed. VLDL Cholesterol Calculated 04/08/2022 see below  Not Established mg/dL Final    Comment: When the triglyceride is <30 mg/dL or >300 mg/dL the  calculated LDL and  VLDL are not valid and a measured  LDL is performed. Vit D, 25-Hydroxy 04/08/2022 10.4 (A) >=30 ng/mL Final    Comment: <=20 ng/mL. ........... [employer] Deficient  21-29 ng/mL. ......... [employer] Insufficient  >=30 ng/mL. ........ [employer] Sufficient      Sodium 04/08/2022 131 (A) 136 - 145 mmol/L Final    Potassium 04/08/2022 4.5  3.5 - 5.1 mmol/L Final    Chloride 04/08/2022 94 (A) 99 - 110 mmol/L Final    CO2 04/08/2022 19 (A) 21 - 32 mmol/L Final    Anion Gap 04/08/2022 18 (A) 3 - 16 Final    Glucose 04/08/2022 437 (A) 70 - 99 mg/dL Final    BUN 04/08/2022 11  7 - 20 mg/dL Final    CREATININE 04/08/2022 0.7 (A) 0.9 - 1.3 mg/dL Final    GFR Non- 04/08/2022 >60  >60 Final    Comment: >60 mL/min/1.73m2 EGFR, calc. for ages 25 and older using the  MDRD formula (not corrected for weight), is valid for stable  renal function. GFR  04/08/2022 >60  >60 Final    Comment: Chronic Kidney Disease: less than 60 ml/min/1.73 sq.m. Kidney Failure: less than 15 ml/min/1.73 sq.m. Results valid for patients 18 years and older. Calcium 04/08/2022 8.9  8.3 - 10.6 mg/dL Final    Total Protein 04/08/2022 6.3 (A) 6.4 - 8.2 g/dL Final    Albumin 04/08/2022 4.0  3.4 - 5.0 g/dL Final    Albumin/Globulin Ratio 04/08/2022 1.7  1.1 - 2.2 Final    Total Bilirubin 04/08/2022 0.3  0.0 - 1.0 mg/dL Final    Alkaline Phosphatase 04/08/2022 112  40 - 129 U/L Final    ALT 04/08/2022 33  10 - 40 U/L Final    AST 04/08/2022 22  15 - 37 U/L Final    LDL Direct 04/08/2022 77  <100 mg/dL Final     No family history on file.   Current Outpatient Medications   Medication Sig Dispense Refill    dapagliflozin (FARXIGA) 10 MG tablet Take 1 tablet by mouth every morning 90 tablet 1    Insulin Glargine-yfgn (SEMGLEE, YFGN,) 100 UNIT/ML SOPN Inject 100-120 units sc daily as directed 45 mL 3    glipiZIDE (GLUCOTROL XL) 10 MG extended release tablet TAKE 1 TABLET DAILY 90 tablet 1    metFORMIN (GLUCOPHAGE-XR) 500 MG extended release tablet TAKE 4 TABLETS DAILY WITH BREAKFAST 360 tablet 1    omega-3 acid ethyl esters (LOVAZA) 1 g capsule Take 2 capsules by mouth 2 times daily 120 capsule 5    pioglitazone (ACTOS) 30 MG tablet Take 1 tablet by mouth daily 90 tablet 3    PARoxetine (PAXIL) 30 MG tablet TAKE 2 TABLETS BY MOUTH DAILY 180 tablet 3    omeprazole (PRILOSEC) 20 MG delayed release capsule Take 1 capsule by mouth daily 90 capsule 3    propranolol (INDERAL LA) 60 MG extended release capsule TAKE 1 CAPSULE BY MOUTH DAILY 90 capsule 3    insulin lispro (HUMALOG) 100 UNIT/ML injection cartridge Take 5-15 units prior to meal as directed 5 each 5    irbesartan (AVAPRO) 150 MG tablet Take 1 tablet by mouth nightly 90 tablet 0    rosuvastatin (CRESTOR) 10 MG tablet Take 1 tablet by mouth nightly 90 tablet 0    Insulin Pen Needle (B-D UF III MINI PEN NEEDLES) 31G X 5 MM MISC USE AS DIRECTED WITH INSULIN 100 each 1    blood glucose test strips (ONE TOUCH ULTRA TEST) strip 1 each by In Vitro route daily USE AS DIRECTED DAILY and as needed 50 strip 1    Dulaglutide (TRULICITY) 1.5 AC/9.6XK SOPN Inject 1.5 mg into the skin once a week 4 pen 5     No current facility-administered medications for this visit. Allergies   Allergen Reactions    Lisinopril Other (See Comments)     COUGH       Review of Systems    Objective:  Ht 5' 11\" (1.803 m)   Wt 203 lb (92.1 kg)   BMI 28.31 kg/m²     BP Readings from Last 3 Encounters:   04/08/22 128/82   06/02/21 118/74   03/02/21 122/80       Pulse Readings from Last 3 Encounters:   04/08/22 86   06/02/21 78   03/25/20 94       Wt Readings from Last 3 Encounters:   07/15/22 203 lb (92.1 kg)   04/08/22 199 lb (90.3 kg)   06/02/21 205 lb (93 kg)       Physical Exam  Vitals and nursing note reviewed. Constitutional:       Appearance: He is well-developed. HENT:      Head: Normocephalic and atraumatic. Eyes:      General: No scleral icterus. Conjunctiva/sclera: Conjunctivae normal.   Pulmonary:      Effort: Pulmonary effort is normal.   Skin:     General: Skin is warm and dry. Neurological:      Mental Status: He is alert and oriented to person, place, and time. Assessment/Plan:      Diagnosis Orders   1. Type 2 diabetes mellitus without complication, without long-term current use of insulin ([facility])  POCT glycosylated hemoglobin (Hb A1C)      2. Diabetic polyneuropathy associated with type 2 diabetes mellitus ([facility] [address]        3. Anxiety state        4. Essential hypertension, benign        5. RLS (restless legs syndrome)        6. KELSI (obstructive sleep apnea)        7. Vitamin D deficiency        8.  Major depression in remission ([facility] [address]          Uncontrolled dm - d/w pt endo referral if not able to get down in next 3 months  Compliance / getting routine/ tickler system/ mediset/ alarms etc. D/w pt  Bp control good on avapro 150  On vascepa for high tg's  Importance of control and making diet changes d/w pt   Routine eye exam/ feet care dw pt  Cont vit d supplement regularly  Cont paxil at present dose - fairly stable  Cont farxiga/ metformin - stop actos/ glipizide/ humalog to simplify regimen  Increase insulin 80/d to split dose higher dosing - eu bid and adjust up 1-2 u every 2-3 days to goal am fbs <130  Start back on trulicity 2.05 weekly  Diet d/w pt  Vitamin D   Jacki Mesa MD, MD  7/15/2022  8:31 AM

## 2022-07-15 NOTE — PATIENT INSTRUCTIONS
Your vitamin D level is low. Take 5000 units of vitamin D3, available over-the-counter, daily. Use a good brand such as [employer] for quality purposes. Recheck vitamin d level in your blood in next 3-6 months.

## 2022-07-21 RX ORDER — BLOOD SUGAR DIAGNOSTIC
STRIP MISCELLANEOUS
Qty: 50 STRIP | Refills: 1 | Status: SHIPPED | OUTPATIENT
Start: 2022-07-21 | End: 2022-08-08 | Stop reason: SDUPTHER

## 2022-08-08 DIAGNOSIS — F41.1 GENERALIZED ANXIETY DISORDER: ICD-10-CM

## 2022-08-09 RX ORDER — BLOOD SUGAR DIAGNOSTIC
STRIP MISCELLANEOUS
Qty: 50 STRIP | Refills: 1 | Status: SHIPPED | OUTPATIENT
Start: 2022-08-09

## 2022-08-09 RX ORDER — OMEPRAZOLE 20 MG/1
20 CAPSULE, DELAYED RELEASE ORAL DAILY
Qty: 90 CAPSULE | Refills: 3 | Status: SHIPPED | OUTPATIENT
Start: 2022-08-09

## 2022-08-09 RX ORDER — IRBESARTAN 150 MG/1
TABLET ORAL
Qty: 90 TABLET | Refills: 3 | OUTPATIENT
Start: 2022-08-09

## 2022-08-09 RX ORDER — OMEPRAZOLE 20 MG/1
CAPSULE, DELAYED RELEASE ORAL
Qty: 90 CAPSULE | Refills: 3 | OUTPATIENT
Start: 2022-08-09

## 2022-08-09 RX ORDER — PROPRANOLOL HCL 60 MG
CAPSULE, EXTENDED RELEASE 24HR ORAL
Qty: 90 CAPSULE | Refills: 3 | OUTPATIENT
Start: 2022-08-09

## 2022-08-09 RX ORDER — ROSUVASTATIN CALCIUM 10 MG/1
10 TABLET, COATED ORAL NIGHTLY
Qty: 90 TABLET | Refills: 0 | Status: SHIPPED | OUTPATIENT
Start: 2022-08-09 | End: 2022-10-21 | Stop reason: SDUPTHER

## 2022-08-09 RX ORDER — ROSUVASTATIN CALCIUM 10 MG/1
TABLET, COATED ORAL
Qty: 90 TABLET | Refills: 3 | OUTPATIENT
Start: 2022-08-09

## 2022-08-09 RX ORDER — IRBESARTAN 150 MG/1
150 TABLET ORAL NIGHTLY
Qty: 90 TABLET | Refills: 0 | Status: SHIPPED | OUTPATIENT
Start: 2022-08-09 | End: 2022-10-21 | Stop reason: SDUPTHER

## 2022-08-09 RX ORDER — PAROXETINE 30 MG/1
TABLET, FILM COATED ORAL
Qty: 180 TABLET | Refills: 3 | Status: SHIPPED | OUTPATIENT
Start: 2022-08-09

## 2022-08-09 RX ORDER — PAROXETINE 30 MG/1
TABLET, FILM COATED ORAL
Qty: 180 TABLET | Refills: 3 | OUTPATIENT
Start: 2022-08-09

## 2022-08-09 RX ORDER — DULAGLUTIDE 0.75 MG/.5ML
0.75 INJECTION, SOLUTION SUBCUTANEOUS WEEKLY
Qty: 4 PEN | Refills: 5 | Status: SHIPPED | OUTPATIENT
Start: 2022-08-09 | End: 2022-10-21 | Stop reason: SDUPTHER

## 2022-08-09 RX ORDER — PEN NEEDLE, DIABETIC 31 GX5/16"
NEEDLE, DISPOSABLE MISCELLANEOUS
Qty: 100 EACH | Refills: 1 | Status: SHIPPED | OUTPATIENT
Start: 2022-08-09

## 2022-08-09 RX ORDER — PROPRANOLOL HCL 60 MG
CAPSULE, EXTENDED RELEASE 24HR ORAL
Qty: 90 CAPSULE | Refills: 3 | Status: SHIPPED | OUTPATIENT
Start: 2022-08-09

## 2022-09-19 ENCOUNTER — PATIENT MESSAGE (OUTPATIENT)
Dept: FAMILY MEDICINE CLINIC | Age: 62
End: 2022-09-19

## 2022-09-19 NOTE — TELEPHONE ENCOUNTER
From: Nilesh Carver  To: Dr. Telma Ring: 9/19/2022 10:34 AM EDT  Subject: sleep issue    Recently I have been having difficulty falling asleep and once I do staying asleep. Getting up several times a night. Sometimes then leads to me snacking during the night, and then blood sugar is high in the am. Was wondering if I could try something, maybe Melatonin? or something along that line and if so how many ml would I start with?  Thanks Archie Arcos

## 2022-09-26 ENCOUNTER — PATIENT MESSAGE (OUTPATIENT)
Dept: FAMILY MEDICINE CLINIC | Age: 62
End: 2022-09-26

## 2022-09-26 NOTE — TELEPHONE ENCOUNTER
From: Lior Hernandez  To: Dr. Adria Smith: 9/26/2022 10:55 AM EDT  Subject: Insurance Form    Good Morning,     i need help, could you fill out this form (see attached ) 1 for Ami Current, and 1 for myself , information from our last visit's , i need these back wednesday or thrusday this week . If you have any questions or problems please let me know.     Thank Ro Infante  [phone] cell

## 2022-10-18 RX ORDER — METFORMIN HYDROCHLORIDE 500 MG/1
TABLET, EXTENDED RELEASE ORAL
Qty: 360 TABLET | Refills: 0 | Status: SHIPPED | OUTPATIENT
Start: 2022-10-18

## 2022-10-21 ENCOUNTER — OFFICE VISIT (OUTPATIENT)
Dept: FAMILY MEDICINE CLINIC | Age: 62
End: 2022-10-21
Payer: COMMERCIAL

## 2022-10-21 VITALS
DIASTOLIC BLOOD PRESSURE: 84 MMHG | BODY MASS INDEX: 28.42 KG/M2 | HEART RATE: 74 BPM | OXYGEN SATURATION: 98 % | WEIGHT: 203 LBS | SYSTOLIC BLOOD PRESSURE: 128 MMHG | HEIGHT: 71 IN

## 2022-10-21 DIAGNOSIS — G47.33 OSA (OBSTRUCTIVE SLEEP APNEA): ICD-10-CM

## 2022-10-21 DIAGNOSIS — E78.2 MIXED HYPERLIPIDEMIA: Chronic | ICD-10-CM

## 2022-10-21 DIAGNOSIS — E55.9 VITAMIN D DEFICIENCY: ICD-10-CM

## 2022-10-21 DIAGNOSIS — E11.9 TYPE 2 DIABETES MELLITUS WITHOUT COMPLICATION, WITHOUT LONG-TERM CURRENT USE OF INSULIN (HCC): Primary | ICD-10-CM

## 2022-10-21 DIAGNOSIS — E11.9 DIABETES MELLITUS TYPE 2 IN NONOBESE (HCC): ICD-10-CM

## 2022-10-21 DIAGNOSIS — F32.5 MAJOR DEPRESSION IN REMISSION (HCC): ICD-10-CM

## 2022-10-21 DIAGNOSIS — E11.42 DIABETIC POLYNEUROPATHY ASSOCIATED WITH TYPE 2 DIABETES MELLITUS (HCC): ICD-10-CM

## 2022-10-21 DIAGNOSIS — I10 ESSENTIAL HYPERTENSION, BENIGN: ICD-10-CM

## 2022-10-21 DIAGNOSIS — F41.1 ANXIETY STATE: Chronic | ICD-10-CM

## 2022-10-21 DIAGNOSIS — Z23 NEED FOR INFLUENZA VACCINATION: ICD-10-CM

## 2022-10-21 DIAGNOSIS — K21.9 GASTROESOPHAGEAL REFLUX DISEASE, UNSPECIFIED WHETHER ESOPHAGITIS PRESENT: ICD-10-CM

## 2022-10-21 LAB
ANION GAP SERPL CALCULATED.3IONS-SCNC: 15 MMOL/L (ref 3–16)
BUN BLDV-MCNC: 9 MG/DL (ref 7–20)
CALCIUM SERPL-MCNC: 9.3 MG/DL (ref 8.3–10.6)
CHLORIDE BLD-SCNC: 101 MMOL/L (ref 99–110)
CO2: 23 MMOL/L (ref 21–32)
CREAT SERPL-MCNC: 0.9 MG/DL (ref 0.8–1.3)
GFR SERPL CREATININE-BSD FRML MDRD: >60 ML/MIN/{1.73_M2}
GLUCOSE BLD-MCNC: 271 MG/DL (ref 70–99)
HBA1C MFR BLD: 8.3 %
POTASSIUM SERPL-SCNC: 4.2 MMOL/L (ref 3.5–5.1)
SODIUM BLD-SCNC: 139 MMOL/L (ref 136–145)

## 2022-10-21 PROCEDURE — 90471 IMMUNIZATION ADMIN: CPT | Performed by: FAMILY MEDICINE

## 2022-10-21 PROCEDURE — 99214 OFFICE O/P EST MOD 30 MIN: CPT | Performed by: FAMILY MEDICINE

## 2022-10-21 PROCEDURE — 90674 CCIIV4 VAC NO PRSV 0.5 ML IM: CPT | Performed by: FAMILY MEDICINE

## 2022-10-21 PROCEDURE — 83036 HEMOGLOBIN GLYCOSYLATED A1C: CPT | Performed by: FAMILY MEDICINE

## 2022-10-21 PROCEDURE — 3052F HG A1C>EQUAL 8.0%<EQUAL 9.0%: CPT | Performed by: FAMILY MEDICINE

## 2022-10-21 RX ORDER — DULAGLUTIDE 0.75 MG/.5ML
0.75 INJECTION, SOLUTION SUBCUTANEOUS WEEKLY
Qty: 2 ML | Refills: 2 | Status: SHIPPED | OUTPATIENT
Start: 2022-10-21

## 2022-10-21 RX ORDER — IRBESARTAN 150 MG/1
150 TABLET ORAL NIGHTLY
Qty: 90 TABLET | Refills: 3 | Status: SHIPPED | OUTPATIENT
Start: 2022-10-21

## 2022-10-21 RX ORDER — ROSUVASTATIN CALCIUM 10 MG/1
10 TABLET, COATED ORAL NIGHTLY
Qty: 90 TABLET | Refills: 3 | Status: SHIPPED | OUTPATIENT
Start: 2022-10-21

## 2022-10-21 NOTE — PROGRESS NOTES
[facility]  Clinic Note    Date: 10/21/2022                                               Subjective:     Chief Complaint   Patient presents with    Diabetes     3 MO DM ROUTINE FOLLOW UP AIC DUE      Diabetes  Doing better overall - better at taking medicine correctly. More motivated to get on top of sugar control  Feeling better overall  On good routine for last 3 months. Taking vitamin D regularly - nature made - 5000u daily  A1c down from 14 to 8.3  Changing some things in diet.   Feeling really good - tolerating trulicity well  Has to stay on good routine - which is challenging  Feels lazy and then frustrated w/ all meds and diet limits  Trying to walk a little more  Due for eye exam? - documented one 3/21  Check spots on leg/ arms  Gets neuropathy sxs in feet frequently  BP Readings from Last 3 Encounters:   10/21/22 128/84   07/15/22 126/72   04/08/22 128/82     Pulse Readings from Last 3 Encounters:   10/21/22 74   04/08/22 86   06/02/21 78     Wt Readings from Last 3 Encounters:   10/21/22 203 lb (92.1 kg)   07/15/22 203 lb (92.1 kg)   04/08/22 199 lb (90.3 kg)            Patient Active Problem List    Diagnosis Date Noted    Vitamin D deficiency 04/20/2016    Major depression in remission ([facility] [address] 01/20/2016    Diabetic polyneuropathy associated with type 2 diabetes mellitus ([facility] [address] 10/20/2015    Diabetes mellitus type 2 in nonobese ([facility] [address] 10/20/2015    Diabetic neuropathy ([facility] [address] 02/04/2015    Skin lesion of face 01/16/2015    Type II or unspecified type diabetes mellitus without mention of complication, not stated as uncontrolled 10/10/2014    RLS (restless legs syndrome) 06/06/2014    KELSI (obstructive sleep apnea) 09/30/2011    Pain in limb 07/18/2011    Anxiety state 07/18/2011    Actinic keratosis 07/18/2011    Impacted cerumen 07/18/2011    Abdominal pain 07/18/2011    Essential hypertension, benign 07/18/2011    Esophageal reflux 07/18/2011    Acute frontal sinusitis 07/18/2011    Other viral warts 07/18/2011    Generalized anxiety disorder 07/18/2011    Hyperlipidemia 07/18/2011    Diabetes mellitus ([facility] [address] 07/18/2011     Past Medical History:   Diagnosis Date    Diabetes mellitus ([facility]     type II    Hypertension     Sleep apnea      Past Surgical History:   Procedure Laterality Date    CHOLECYSTECTOMY  2010     Office Visit on 07/15/2022   Component Date Value Ref Range Status    Hemoglobin A1C 07/15/2022 14.0  % Final     No family history on file. Current Outpatient Medications   Medication Sig Dispense Refill    metFORMIN (GLUCOPHAGE-XR) 500 MG extended release tablet TAKE 4 TABLETS DAILY WITH BREAKFAST 360 tablet 0    Insulin Pen Needle (B-D UF III MINI PEN NEEDLES) 31G X 5 MM MISC USE AS DIRECTED WITH INSULIN 100 each 1    irbesartan (AVAPRO) 150 MG tablet Take 1 tablet by mouth nightly 90 tablet 0    rosuvastatin (CRESTOR) 10 MG tablet Take 1 tablet by mouth nightly 90 tablet 0    PARoxetine (PAXIL) 30 MG tablet TAKE 2 TABLETS BY MOUTH DAILY 180 tablet 3    omeprazole (PRILOSEC) 20 MG delayed release capsule Take 1 capsule by mouth in the morning. 90 capsule 3    propranolol (INDERAL LA) 60 MG extended release capsule TAKE 1 CAPSULE BY MOUTH DAILY 90 capsule 3    dapagliflozin (FARXIGA) 10 MG tablet Take 1 tablet by mouth every morning 90 tablet 1    Dulaglutide (TRULICITY) 3.45 MR/5.7RU SOPN Inject 0.75 mg into the skin once a week 4 pen 5    blood glucose test strips (ONETOUCH ULTRA) strip As needed. USE AS DIRECTED DAILY AS NEEDED 50 strip 1    Insulin Glargine-yfgn (SEMGLEE, YFGN,) 100 UNIT/ML SOPN Inject 100-120 units sc daily as directed 45 mL 3    omega-3 acid ethyl esters (LOVAZA) 1 g capsule Take 2 capsules by mouth 2 times daily 120 capsule 5     No current facility-administered medications for this visit. Allergies   Allergen Reactions    Lisinopril Other (See Comments)     COUGH       Review of Systems    Objective: There were no vitals taken for this visit.     BP Readings from Last 3 Encounters:   07/15/22 126/72   04/08/22 128/82   06/02/21 118/74       Pulse Readings from Last 3 Encounters:   04/08/22 86   06/02/21 78   03/25/20 94       Wt Readings from Last 3 Encounters:   07/15/22 203 lb (92.1 kg)   04/08/22 199 lb (90.3 kg)   06/02/21 205 lb (93 kg)       Physical Exam  Vitals and nursing note reviewed. Constitutional:       Appearance: He is well-developed. HENT:      Head: Normocephalic and atraumatic. Eyes:      General: No scleral icterus. Conjunctiva/sclera: Conjunctivae normal.   Pulmonary:      Effort: Pulmonary effort is normal.   Skin:     General: Skin is warm and dry. Comments: Actinic changes on arms  Small wart left distal thigh   Neurological:      Mental Status: He is alert and oriented to person, place, and time. Comments: Vibratory sense intact bilat feet o/w feet ess clear       Assessment/Plan:     Type 2 diabetes mellitus without complication, without long-term current use of insulin ([facility])  Need for influenza vaccination  Anxiety state  Essential hypertension, benign  Mixed hyperlipidemia  Diabetic polyneuropathy associated with type 2 diabetes mellitus ([facility] [address]  Diabetes mellitus type 2 in nonobese ([facility] [address]  Major depression in remission ([facility] [address]  KELSI (obstructive sleep apnea)  Gastroesophageal reflux disease, unspecified whether esophagitis present     Last a1c 14% -down to 8.3%  On metformin 2g/d, farxiga, trulicity and semglee (actos, glipizide and humalog stopped) - compliance is biggest issue and need to simplify regimen - d/w pt bumping trulicity 1.5 to 3 weekly, but concerns w/ higher dose effects.   Bp control good w/ avapro/ inderal  Paxil for anxiety/ depression  Crestor/ lovaza for lipids  Labs reviewed 4/22  Check vit d on supplement and bmp today  Routine eye exam/ feet care d/w pt  Ppi daily to qod for gerd  F/u 3 months w/ a1c  Congrats on progress  Wart stick for wart tx - cryo to arms if worsening - or consider derm  Sun protection d/w pt  Renee Tarango MD, MD  10/21/2022  7:45 AM

## 2022-10-22 LAB — VITAMIN D 25-HYDROXY: 46.8 NG/ML

## 2022-11-22 ENCOUNTER — PATIENT MESSAGE (OUTPATIENT)
Dept: FAMILY MEDICINE CLINIC | Age: 62
End: 2022-11-22

## 2022-11-22 RX ORDER — BENZONATATE 200 MG/1
200 CAPSULE ORAL 3 TIMES DAILY PRN
Qty: 20 CAPSULE | Refills: 0 | Status: SHIPPED | OUTPATIENT
Start: 2022-11-22 | End: 2022-11-29

## 2022-11-22 NOTE — TELEPHONE ENCOUNTER
From: Sasha Baxter  To: Dr. Barbara Wallace: 11/22/2022 5:05 PM EST  Subject: cough medicine    Hi, was wondering what I could take. I have a terrible cough, with chest congestion hurts alittle . What would I be able to take for this. No fever.

## 2023-01-24 ENCOUNTER — OFFICE VISIT (OUTPATIENT)
Dept: FAMILY MEDICINE CLINIC | Age: 63
End: 2023-01-24
Payer: COMMERCIAL

## 2023-01-24 VITALS
HEART RATE: 86 BPM | DIASTOLIC BLOOD PRESSURE: 78 MMHG | WEIGHT: 201 LBS | OXYGEN SATURATION: 98 % | BODY MASS INDEX: 28.14 KG/M2 | HEIGHT: 71 IN | SYSTOLIC BLOOD PRESSURE: 126 MMHG

## 2023-01-24 DIAGNOSIS — G47.33 OSA (OBSTRUCTIVE SLEEP APNEA): ICD-10-CM

## 2023-01-24 DIAGNOSIS — F41.1 ANXIETY STATE: Chronic | ICD-10-CM

## 2023-01-24 DIAGNOSIS — E55.9 VITAMIN D DEFICIENCY: ICD-10-CM

## 2023-01-24 DIAGNOSIS — E11.42 DIABETIC POLYNEUROPATHY ASSOCIATED WITH TYPE 2 DIABETES MELLITUS (HCC): ICD-10-CM

## 2023-01-24 DIAGNOSIS — E11.9 TYPE 2 DIABETES MELLITUS WITHOUT COMPLICATION, WITHOUT LONG-TERM CURRENT USE OF INSULIN (HCC): Primary | ICD-10-CM

## 2023-01-24 DIAGNOSIS — B07.9 VIRAL WARTS, UNSPECIFIED TYPE: ICD-10-CM

## 2023-01-24 DIAGNOSIS — I10 ESSENTIAL HYPERTENSION, BENIGN: ICD-10-CM

## 2023-01-24 LAB — HBA1C MFR BLD: 10.1 %

## 2023-01-24 PROCEDURE — 99214 OFFICE O/P EST MOD 30 MIN: CPT | Performed by: FAMILY MEDICINE

## 2023-01-24 PROCEDURE — 83036 HEMOGLOBIN GLYCOSYLATED A1C: CPT | Performed by: FAMILY MEDICINE

## 2023-01-24 PROCEDURE — 3046F HEMOGLOBIN A1C LEVEL >9.0%: CPT | Performed by: FAMILY MEDICINE

## 2023-01-24 PROCEDURE — 3074F SYST BP LT 130 MM HG: CPT | Performed by: FAMILY MEDICINE

## 2023-01-24 PROCEDURE — 3078F DIAST BP <80 MM HG: CPT | Performed by: FAMILY MEDICINE

## 2023-01-24 RX ORDER — DULAGLUTIDE 3 MG/.5ML
3 INJECTION, SOLUTION SUBCUTANEOUS WEEKLY
Qty: 2 ML | Refills: 5 | Status: SHIPPED | OUTPATIENT
Start: 2023-01-24

## 2023-01-24 RX ORDER — METFORMIN HYDROCHLORIDE 500 MG/1
TABLET, EXTENDED RELEASE ORAL
Qty: 360 TABLET | Refills: 3 | Status: SHIPPED | OUTPATIENT
Start: 2023-01-24

## 2023-01-24 ASSESSMENT — PATIENT HEALTH QUESTIONNAIRE - PHQ9
4. FEELING TIRED OR HAVING LITTLE ENERGY: 0
5. POOR APPETITE OR OVEREATING: 0
10. IF YOU CHECKED OFF ANY PROBLEMS, HOW DIFFICULT HAVE THESE PROBLEMS MADE IT FOR YOU TO DO YOUR WORK, TAKE CARE OF THINGS AT HOME, OR GET ALONG WITH OTHER PEOPLE: 0
SUM OF ALL RESPONSES TO PHQ QUESTIONS 1-9: 4
7. TROUBLE CONCENTRATING ON THINGS, SUCH AS READING THE NEWSPAPER OR WATCHING TELEVISION: 3
SUM OF ALL RESPONSES TO PHQ9 QUESTIONS 1 & 2: 1
9. THOUGHTS THAT YOU WOULD BE BETTER OFF DEAD, OR OF HURTING YOURSELF: 0
SUM OF ALL RESPONSES TO PHQ QUESTIONS 1-9: 4
1. LITTLE INTEREST OR PLEASURE IN DOING THINGS: 1
3. TROUBLE FALLING OR STAYING ASLEEP: 0
2. FEELING DOWN, DEPRESSED OR HOPELESS: 0
8. MOVING OR SPEAKING SO SLOWLY THAT OTHER PEOPLE COULD HAVE NOTICED. OR THE OPPOSITE, BEING SO FIGETY OR RESTLESS THAT YOU HAVE BEEN MOVING AROUND A LOT MORE THAN USUAL: 0
6. FEELING BAD ABOUT YOURSELF - OR THAT YOU ARE A FAILURE OR HAVE LET YOURSELF OR YOUR FAMILY DOWN: 0
SUM OF ALL RESPONSES TO PHQ QUESTIONS 1-9: 4
SUM OF ALL RESPONSES TO PHQ QUESTIONS 1-9: 4

## 2023-01-24 NOTE — PROGRESS NOTES
[facility] Medicine  Clinic Note    Date: 1/24/2023                                               Subjective:     Chief Complaint   Patient presents with    Diabetes     DM ROUTINE FOLLOW UP AIC DUE      Diabetes      Neuropathy - sxs in feet - stable  Eye exam done march last year? Vision okay  Seen w/ warts last time  Not very active - still working weekend job but cut back - about every other weekend. Today 10.1% a1c  Tolerating 1.5 trulicity weekly  Only using 70 units lantus/ semglee - cut back around [place] as bs down to 90's  Felt dehydrated especially in am, but no hypoglycemic sxs.   Bs this am 145  Hemoglobin A1C   Date Value Ref Range Status   10/21/2022 8.3 % Final       BP Readings from Last 3 Encounters:   10/21/22 128/84   07/15/22 126/72   04/08/22 128/82     Pulse Readings from Last 3 Encounters:   10/21/22 74   04/08/22 86   06/02/21 78     Wt Readings from Last 3 Encounters:   10/21/22 203 lb (92.1 kg)   07/15/22 203 lb (92.1 kg)   04/08/22 199 lb (90.3 kg)            Patient Active Problem List    Diagnosis Date Noted    Vitamin D deficiency 04/20/2016    Major depression in remission ([facility] 01/20/2016    Diabetic polyneuropathy associated with type 2 diabetes mellitus ([facility] 10/20/2015    Diabetes mellitus type 2 in nonobese ([facility] 10/20/2015    Diabetic neuropathy ([facility] 02/04/2015    Skin lesion of face 01/16/2015    Type II or unspecified type diabetes mellitus without mention of complication, not stated as uncontrolled 10/10/2014    RLS (restless legs syndrome) 06/06/2014    KELSI (obstructive sleep apnea) 09/30/2011    Pain in limb 07/18/2011    Anxiety state 07/18/2011    Actinic keratosis 07/18/2011    Impacted cerumen 07/18/2011    Abdominal pain 07/18/2011    Essential hypertension, benign 07/18/2011    Esophageal reflux 07/18/2011    Acute frontal sinusitis 07/18/2011    Other viral warts 07/18/2011    Generalized anxiety disorder 07/18/2011    Hyperlipidemia 07/18/2011    Diabetes mellitus ([facility] 07/18/2011     Past Medical History:   Diagnosis Date    Diabetes mellitus ([facility]     type II    Hypertension     Sleep apnea      Past Surgical History:   Procedure Laterality Date    CHOLECYSTECTOMY  2010     Office Visit on 10/21/2022   Component Date Value Ref Range Status    Hemoglobin A1C 10/21/2022 8.3  % Final    Vit D, 25-Hydroxy 10/21/2022 46.8  >=30 ng/mL Final    Comment: <=20 ng/mL. ........... Rj Gutter Deficient  21-29 ng/mL. ......... Rj Gutter Insufficient  >=30 ng/mL. ........ Rj Gutter Sufficient      Sodium 10/21/2022 139  136 - 145 mmol/L Final    Potassium 10/21/2022 4.2  3.5 - 5.1 mmol/L Final    Chloride 10/21/2022 101  99 - 110 mmol/L Final    CO2 10/21/2022 23  21 - 32 mmol/L Final    Anion Gap 10/21/2022 15  3 - 16 Final    Glucose 10/21/2022 271 (A)  70 - 99 mg/dL Final    BUN 10/21/2022 9  7 - 20 mg/dL Final    Creatinine 10/21/2022 0.9  0.8 - 1.3 mg/dL Final    Est, Glom Filt Rate 10/21/2022 >60  >60 Final    Comment: Pediatric calculator link  [email or web address] [email or web address]/gfr_[email or web address]  Effective Oct 3, 2022  These results are not intended for use in patients  <25years of age. eGFR results are calculated without  a race factor using the 2021 CKD-EPI equation. Careful  clinical correlation is recommended, particularly when  comparing to results calculated using previous equations. The CKD-EPI equation is less accurate in patients with  extremes of muscle mass, extra-renal metabolism of  creatinine, excessive creatinine ingestion, or following  therapy that affects renal tubular secretion. Calcium 10/21/2022 9.3  8.3 - 10.6 mg/dL Final     No family history on file.   Current Outpatient Medications   Medication Sig Dispense Refill    irbesartan (AVAPRO) 150 MG tablet Take 1 tablet by mouth nightly 90 tablet 3    rosuvastatin (CRESTOR) 10 MG tablet Take 1 tablet by mouth nightly 90 tablet 3    Dulaglutide (TRULICITY) 7.84 IW/3.1JA SOPN Inject 0.75 mg into the skin once a week 2 mL 2    metFORMIN (GLUCOPHAGE-XR) 500 MG extended release tablet TAKE 4 TABLETS DAILY WITH BREAKFAST 360 tablet 0    Insulin Pen Needle (B-D UF III MINI PEN NEEDLES) 31G X 5 MM MISC USE AS DIRECTED WITH INSULIN 100 each 1    PARoxetine (PAXIL) 30 MG tablet TAKE 2 TABLETS BY MOUTH DAILY 180 tablet 3    omeprazole (PRILOSEC) 20 MG delayed release capsule Take 1 capsule by mouth in the morning. 90 capsule 3    propranolol (INDERAL LA) 60 MG extended release capsule TAKE 1 CAPSULE BY MOUTH DAILY 90 capsule 3    dapagliflozin (FARXIGA) 10 MG tablet Take 1 tablet by mouth every morning 90 tablet 1    blood glucose test strips (ONETOUCH ULTRA) strip As needed. USE AS DIRECTED DAILY AS NEEDED 50 strip 1    Insulin Glargine-yfgn (SEMGLEE, YFGN,) 100 UNIT/ML SOPN Inject 100-120 units sc daily as directed 45 mL 3    omega-3 acid ethyl esters (LOVAZA) 1 g capsule Take 2 capsules by mouth 2 times daily 120 capsule 5     No current facility-administered medications for this visit. Allergies   Allergen Reactions    Lisinopril Other (See Comments)     COUGH       Review of Systems    Objective:  Ht 5' 11\" (1.803 m)   BMI 28.31 kg/m²     BP Readings from Last 3 Encounters:   10/21/22 128/84   07/15/22 126/72   04/08/22 128/82       Pulse Readings from Last 3 Encounters:   10/21/22 74   04/08/22 86   06/02/21 78       Wt Readings from Last 3 Encounters:   10/21/22 203 lb (92.1 kg)   07/15/22 203 lb (92.1 kg)   04/08/22 199 lb (90.3 kg)       Physical Exam  Constitutional:       General: He is not in acute distress. Appearance: He is well-developed. Eyes:      General: No scleral icterus. Conjunctiva/sclera: Conjunctivae normal.   Cardiovascular:      Rate and Rhythm: Normal rate and regular rhythm. Heart sounds: Normal heart sounds. No murmur heard. No gallop. Pulmonary:      Effort: Pulmonary effort is normal. No respiratory distress. Breath sounds: Normal breath sounds. No wheezing, rhonchi or rales. Abdominal:      General: Bowel sounds are normal. There is no distension. Palpations: Abdomen is soft. Tenderness: There is no abdominal tenderness. Musculoskeletal:         General: No swelling. Skin:     Findings: No erythema or rash. Neurological:      Mental Status: He is alert.        Assessment/Plan:     Type 2 diabetes mellitus without complication, without long-term current use of insulin (HCC)  -     POCT glycosylated hemoglobin (Hb A1C)  Viral warts, unspecified type  Diabetic polyneuropathy associated with type 2 diabetes mellitus (HCC)  Anxiety state  Essential hypertension, benign  KELSI (obstructive sleep apnea)  Vitamin D deficiency       Low sugar/ simple carb diet/ increased physical activity d/w pt  Trulicity, ,metformin,farxiga, insulin for bs control  Increase insulin gradually to goal am bs  d/w pt  Increase trulicity 1.5 to 3 - shortages dw/pt as well as side effect  Routine feet care/ eye exam encouraged  Avapro/ inderal  for bp/ anxiety along w/ paxil  Ppi at lowest effective dose  Lovaza/ crestor for lipid mgmt  Labs 4/22 reviewed - recheck fasting labs on f/u visit in 3 months    Tyesha Messer MD, MD  1/24/2023  7:48 AM

## 2023-03-29 RX ORDER — DULAGLUTIDE 3 MG/.5ML
3 INJECTION, SOLUTION SUBCUTANEOUS WEEKLY
Qty: 2 ML | Refills: 1 | Status: SHIPPED | OUTPATIENT
Start: 2023-03-29

## 2023-04-16 ENCOUNTER — PATIENT MESSAGE (OUTPATIENT)
Dept: FAMILY MEDICINE CLINIC | Age: 63
End: 2023-04-16

## 2023-04-16 DIAGNOSIS — L98.9 SKIN LESION: Primary | ICD-10-CM

## 2023-04-17 NOTE — TELEPHONE ENCOUNTER
From: Marieta Fothergill  To: Dr. Julianne Davalos: 4/16/2023 2:14 PM EDT  Subject: growths on arms    I have several growths appearing on my arms and was wondering if you have a dermatologist that you would be able to refer me to.  Thanks

## 2023-06-14 ENCOUNTER — TELEPHONE (OUTPATIENT)
Dept: FAMILY MEDICINE CLINIC | Age: 63
End: 2023-06-14

## 2023-06-14 NOTE — TELEPHONE ENCOUNTER
Patient has schedule a appointment with Dr. Tiffany Carmichael for 7/13/23 at 9:40 am, but he needs his DM medication refill - TRULICITY 3 KL/3.5SU SOPN. [id number] PHONE NO. [phone]    Please give him a call back.

## 2023-07-10 ENCOUNTER — TELEPHONE (OUTPATIENT)
Dept: FAMILY MEDICINE CLINIC | Age: 63
End: 2023-07-10

## 2023-07-10 RX ORDER — METFORMIN HYDROCHLORIDE 500 MG/1
TABLET, EXTENDED RELEASE ORAL
Qty: 360 TABLET | Refills: 0 | Status: SHIPPED | OUTPATIENT
Start: 2023-07-10 | End: 2023-07-13 | Stop reason: SDUPTHER

## 2023-07-13 ENCOUNTER — OFFICE VISIT (OUTPATIENT)
Dept: FAMILY MEDICINE CLINIC | Age: 63
End: 2023-07-13
Payer: COMMERCIAL

## 2023-07-13 VITALS
SYSTOLIC BLOOD PRESSURE: 130 MMHG | BODY MASS INDEX: 27.44 KG/M2 | DIASTOLIC BLOOD PRESSURE: 78 MMHG | HEART RATE: 86 BPM | OXYGEN SATURATION: 98 % | HEIGHT: 71 IN | WEIGHT: 196 LBS

## 2023-07-13 DIAGNOSIS — E55.9 VITAMIN D DEFICIENCY: ICD-10-CM

## 2023-07-13 DIAGNOSIS — E11.9 DIABETES MELLITUS TYPE 2 IN NONOBESE (HCC): ICD-10-CM

## 2023-07-13 DIAGNOSIS — K21.9 GASTROESOPHAGEAL REFLUX DISEASE, UNSPECIFIED WHETHER ESOPHAGITIS PRESENT: ICD-10-CM

## 2023-07-13 DIAGNOSIS — E78.5 HYPERLIPIDEMIA, UNSPECIFIED HYPERLIPIDEMIA TYPE: ICD-10-CM

## 2023-07-13 DIAGNOSIS — E11.40 TYPE 2 DIABETES MELLITUS WITH DIABETIC NEUROPATHY, UNSPECIFIED WHETHER LONG TERM INSULIN USE (HCC): Primary | ICD-10-CM

## 2023-07-13 DIAGNOSIS — F32.5 MAJOR DEPRESSION IN REMISSION (HCC): ICD-10-CM

## 2023-07-13 DIAGNOSIS — F41.1 GENERALIZED ANXIETY DISORDER: ICD-10-CM

## 2023-07-13 DIAGNOSIS — G25.81 RLS (RESTLESS LEGS SYNDROME): ICD-10-CM

## 2023-07-13 DIAGNOSIS — G47.33 OSA (OBSTRUCTIVE SLEEP APNEA): ICD-10-CM

## 2023-07-13 DIAGNOSIS — I10 ESSENTIAL HYPERTENSION, BENIGN: ICD-10-CM

## 2023-07-13 LAB
25(OH)D3 SERPL-MCNC: 42.6 NG/ML
ALBUMIN SERPL-MCNC: 4.3 G/DL (ref 3.4–5)
ALBUMIN/GLOB SERPL: 2.2 {RATIO} (ref 1.1–2.2)
ALP SERPL-CCNC: 101 U/L (ref 40–129)
ALT SERPL-CCNC: 21 U/L (ref 10–40)
ANION GAP SERPL CALCULATED.3IONS-SCNC: 9 MMOL/L (ref 3–16)
AST SERPL-CCNC: 16 U/L (ref 15–37)
BILIRUB SERPL-MCNC: 0.4 MG/DL (ref 0–1)
BUN SERPL-MCNC: 9 MG/DL (ref 7–20)
CALCIUM SERPL-MCNC: 9.6 MG/DL (ref 8.3–10.6)
CHLORIDE SERPL-SCNC: 102 MMOL/L (ref 99–110)
CHOLEST SERPL-MCNC: 106 MG/DL (ref 0–199)
CO2 SERPL-SCNC: 29 MMOL/L (ref 21–32)
CREAT SERPL-MCNC: 0.8 MG/DL (ref 0.8–1.3)
GFR SERPLBLD CREATININE-BSD FMLA CKD-EPI: >60 ML/MIN/{1.73_M2}
GLUCOSE SERPL-MCNC: 147 MG/DL (ref 70–99)
HBA1C MFR BLD: 8.5 %
HDLC SERPL-MCNC: 36 MG/DL (ref 40–60)
LDLC SERPL CALC-MCNC: 21 MG/DL
POTASSIUM SERPL-SCNC: 4.5 MMOL/L (ref 3.5–5.1)
PROT SERPL-MCNC: 6.3 G/DL (ref 6.4–8.2)
SODIUM SERPL-SCNC: 140 MMOL/L (ref 136–145)
TRIGL SERPL-MCNC: 246 MG/DL (ref 0–150)
VLDLC SERPL CALC-MCNC: 49 MG/DL

## 2023-07-13 PROCEDURE — 3075F SYST BP GE 130 - 139MM HG: CPT | Performed by: FAMILY MEDICINE

## 2023-07-13 PROCEDURE — 99214 OFFICE O/P EST MOD 30 MIN: CPT | Performed by: FAMILY MEDICINE

## 2023-07-13 PROCEDURE — 3046F HEMOGLOBIN A1C LEVEL >9.0%: CPT | Performed by: FAMILY MEDICINE

## 2023-07-13 PROCEDURE — 83037 HB GLYCOSYLATED A1C HOME DEV: CPT | Performed by: FAMILY MEDICINE

## 2023-07-13 PROCEDURE — 3078F DIAST BP <80 MM HG: CPT | Performed by: FAMILY MEDICINE

## 2023-07-13 RX ORDER — PROCHLORPERAZINE 25 MG/1
SUPPOSITORY RECTAL
Qty: 3 EACH | Refills: 11 | Status: SHIPPED | OUTPATIENT
Start: 2023-07-13

## 2023-07-13 RX ORDER — OMEPRAZOLE 20 MG/1
20 CAPSULE, DELAYED RELEASE ORAL DAILY
Qty: 90 CAPSULE | Refills: 3 | Status: SHIPPED | OUTPATIENT
Start: 2023-07-13

## 2023-07-13 RX ORDER — FLURBIPROFEN SODIUM 0.3 MG/ML
SOLUTION/ DROPS OPHTHALMIC
Qty: 100 EACH | Refills: 5 | Status: SHIPPED | OUTPATIENT
Start: 2023-07-13

## 2023-07-13 RX ORDER — ONDANSETRON 4 MG/1
4 TABLET, ORALLY DISINTEGRATING ORAL 3 TIMES DAILY PRN
Qty: 21 TABLET | Refills: 0 | Status: SHIPPED | OUTPATIENT
Start: 2023-07-13

## 2023-07-13 RX ORDER — PAROXETINE 30 MG/1
TABLET, FILM COATED ORAL
Qty: 180 TABLET | Refills: 3 | Status: SHIPPED | OUTPATIENT
Start: 2023-07-13

## 2023-07-13 RX ORDER — PIOGLITAZONEHYDROCHLORIDE 30 MG/1
30 TABLET ORAL DAILY
Qty: 90 TABLET | Refills: 1 | Status: SHIPPED | OUTPATIENT
Start: 2023-07-13

## 2023-07-13 RX ORDER — METFORMIN HYDROCHLORIDE 500 MG/1
TABLET, EXTENDED RELEASE ORAL
Qty: 360 TABLET | Refills: 3 | Status: SHIPPED | OUTPATIENT
Start: 2023-07-13

## 2023-07-13 RX ORDER — BLOOD SUGAR DIAGNOSTIC
STRIP MISCELLANEOUS
Qty: 50 STRIP | Refills: 5 | Status: SHIPPED | OUTPATIENT
Start: 2023-07-13

## 2023-07-13 RX ORDER — DULAGLUTIDE 3 MG/.5ML
3 INJECTION, SOLUTION SUBCUTANEOUS WEEKLY
Qty: 2 ML | Refills: 1 | Status: CANCELLED | OUTPATIENT
Start: 2023-07-13

## 2023-07-13 RX ORDER — PROCHLORPERAZINE 25 MG/1
SUPPOSITORY RECTAL
Qty: 1 EACH | Refills: 5 | Status: SHIPPED | OUTPATIENT
Start: 2023-07-13

## 2023-07-13 RX ORDER — IRBESARTAN 150 MG/1
150 TABLET ORAL NIGHTLY
Qty: 90 TABLET | Refills: 3 | Status: SHIPPED | OUTPATIENT
Start: 2023-07-13

## 2023-07-13 RX ORDER — PROPRANOLOL HCL 60 MG
CAPSULE, EXTENDED RELEASE 24HR ORAL
Qty: 90 CAPSULE | Refills: 3 | Status: SHIPPED | OUTPATIENT
Start: 2023-07-13

## 2023-07-13 RX ORDER — PROCHLORPERAZINE 25 MG/1
SUPPOSITORY RECTAL
Qty: 1 EACH | Refills: 0 | Status: SHIPPED | OUTPATIENT
Start: 2023-07-13

## 2023-07-13 RX ORDER — ROSUVASTATIN CALCIUM 10 MG/1
10 TABLET, COATED ORAL NIGHTLY
Qty: 90 TABLET | Refills: 3 | Status: SHIPPED | OUTPATIENT
Start: 2023-07-13

## 2023-07-13 RX ORDER — INSULIN GLARGINE-YFGN 100 [IU]/ML
INJECTION, SOLUTION SUBCUTANEOUS
Qty: 45 ML | Refills: 3 | Status: SHIPPED | OUTPATIENT
Start: 2023-07-13

## 2023-07-13 RX ORDER — ICOSAPENT ETHYL 1000 MG/1
2 CAPSULE ORAL 2 TIMES DAILY
Qty: 360 CAPSULE | Refills: 3 | Status: SHIPPED | OUTPATIENT
Start: 2023-07-13

## 2023-07-13 NOTE — PROGRESS NOTES
131  Avapro/ propanolol  for bp control  Ppi lowest effective dose  Crestor/ lovaza pending results  Cont paxil for now - refer to psych CNP  D/ w pt option of adding low dose abilify but want to hold presently  Recheck 3 months w/ a1c  Fasting labs today  Emma Del Rio MD, MD  7/13/2023  9:39 AM

## 2023-07-13 NOTE — PATIENT INSTRUCTIONS

## 2023-07-17 ENCOUNTER — TELEPHONE (OUTPATIENT)
Dept: ADMINISTRATIVE | Age: 63
End: 2023-07-17

## 2023-07-17 NOTE — TELEPHONE ENCOUNTER
Submitted PA for ICOSAPENT ETHYL  VIA FAX TO [facility]  STATUS: PENDING. Follow up done daily; if no response in three days we will refax for status check. If another three days goes by with no response we will call the insurance for status.

## 2023-08-02 NOTE — TELEPHONE ENCOUNTER
NOLAN Robles. APPROVED THROUGH 07/16/2024. REF # [id number]    If this requires a response please respond to the pool. [facility] [place]). Please advise patient thank you.

## 2023-08-19 ENCOUNTER — PATIENT MESSAGE (OUTPATIENT)
Dept: FAMILY MEDICINE CLINIC | Age: 63
End: 2023-08-19

## 2023-08-21 ENCOUNTER — OFFICE VISIT (OUTPATIENT)
Dept: FAMILY MEDICINE CLINIC | Age: 63
End: 2023-08-21
Payer: COMMERCIAL

## 2023-08-21 ENCOUNTER — HOSPITAL ENCOUNTER (OUTPATIENT)
Dept: ULTRASOUND IMAGING | Age: 63
Discharge: HOME | End: 2023-08-21
Payer: COMMERCIAL

## 2023-08-21 VITALS
OXYGEN SATURATION: 98 % | HEIGHT: 71 IN | SYSTOLIC BLOOD PRESSURE: 136 MMHG | WEIGHT: 190.6 LBS | HEART RATE: 68 BPM | BODY MASS INDEX: 26.68 KG/M2 | DIASTOLIC BLOOD PRESSURE: 82 MMHG

## 2023-08-21 DIAGNOSIS — R10.13 EPIGASTRIC PAIN: ICD-10-CM

## 2023-08-21 DIAGNOSIS — R11.2 NAUSEA AND VOMITING, UNSPECIFIED VOMITING TYPE: ICD-10-CM

## 2023-08-21 DIAGNOSIS — E11.40 TYPE 2 DIABETES MELLITUS WITH DIABETIC NEUROPATHY, UNSPECIFIED WHETHER LONG TERM INSULIN USE (HCC): ICD-10-CM

## 2023-08-21 DIAGNOSIS — R11.2 NAUSEA AND VOMITING, UNSPECIFIED VOMITING TYPE: Primary | ICD-10-CM

## 2023-08-21 LAB
ALBUMIN SERPL-MCNC: 4.4 G/DL (ref 3.4–5)
ALBUMIN/GLOB SERPL: 1.9 {RATIO} (ref 1.1–2.2)
ALP SERPL-CCNC: 106 U/L (ref 40–129)
ALT SERPL-CCNC: 23 U/L (ref 10–40)
ANION GAP SERPL CALCULATED.3IONS-SCNC: 10 MMOL/L (ref 3–16)
AST SERPL-CCNC: 15 U/L (ref 15–37)
BASOPHILS # BLD: 0 K/UL (ref 0–0.2)
BASOPHILS NFR BLD: 0.2 %
BILIRUB SERPL-MCNC: 0.3 MG/DL (ref 0–1)
BUN SERPL-MCNC: 9 MG/DL (ref 7–20)
CALCIUM SERPL-MCNC: 9.9 MG/DL (ref 8.3–10.6)
CHLORIDE SERPL-SCNC: 100 MMOL/L (ref 99–110)
CO2 SERPL-SCNC: 28 MMOL/L (ref 21–32)
CREAT SERPL-MCNC: 0.7 MG/DL (ref 0.8–1.3)
DEPRECATED RDW RBC AUTO: 14.2 % (ref 12.4–15.4)
EOSINOPHIL # BLD: 0.2 K/UL (ref 0–0.6)
EOSINOPHIL NFR BLD: 2.4 %
GFR SERPLBLD CREATININE-BSD FMLA CKD-EPI: >60 ML/MIN/{1.73_M2}
GLUCOSE SERPL-MCNC: 170 MG/DL (ref 70–99)
HCT VFR BLD AUTO: 48.4 % (ref 40.5–52.5)
HGB BLD-MCNC: 16.7 G/DL (ref 13.5–17.5)
LYMPHOCYTES # BLD: 2.2 K/UL (ref 1–5.1)
LYMPHOCYTES NFR BLD: 32.2 %
MCH RBC QN AUTO: 28.3 PG (ref 26–34)
MCHC RBC AUTO-ENTMCNC: 34.6 G/DL (ref 31–36)
MCV RBC AUTO: 81.7 FL (ref 80–100)
MONOCYTES # BLD: 0.9 K/UL (ref 0–1.3)
MONOCYTES NFR BLD: 12.7 %
NEUTROPHILS # BLD: 3.6 K/UL (ref 1.7–7.7)
NEUTROPHILS NFR BLD: 52.5 %
PLATELET # BLD AUTO: 164 K/UL (ref 135–450)
PMV BLD AUTO: 8.3 FL (ref 5–10.5)
POTASSIUM SERPL-SCNC: 4.3 MMOL/L (ref 3.5–5.1)
PROT SERPL-MCNC: 6.7 G/DL (ref 6.4–8.2)
RBC # BLD AUTO: 5.92 M/UL (ref 4.2–5.9)
SODIUM SERPL-SCNC: 138 MMOL/L (ref 136–145)
WBC # BLD AUTO: 6.9 K/UL (ref 4–11)

## 2023-08-21 PROCEDURE — 3079F DIAST BP 80-89 MM HG: CPT | Performed by: NURSE PRACTITIONER

## 2023-08-21 PROCEDURE — 3052F HG A1C>EQUAL 8.0%<EQUAL 9.0%: CPT | Performed by: NURSE PRACTITIONER

## 2023-08-21 PROCEDURE — 76705 ECHO EXAM OF ABDOMEN: CPT

## 2023-08-21 PROCEDURE — 3075F SYST BP GE 130 - 139MM HG: CPT | Performed by: NURSE PRACTITIONER

## 2023-08-21 PROCEDURE — 99214 OFFICE O/P EST MOD 30 MIN: CPT | Performed by: NURSE PRACTITIONER

## 2023-08-21 SDOH — ECONOMIC STABILITY: FOOD INSECURITY: WITHIN THE PAST 12 MONTHS, THE FOOD YOU BOUGHT JUST DIDN'T LAST AND YOU DIDN'T HAVE MONEY TO GET MORE.: NEVER TRUE

## 2023-08-21 SDOH — ECONOMIC STABILITY: FOOD INSECURITY: WITHIN THE PAST 12 MONTHS, YOU WORRIED THAT YOUR FOOD WOULD RUN OUT BEFORE YOU GOT MONEY TO BUY MORE.: NEVER TRUE

## 2023-08-21 SDOH — ECONOMIC STABILITY: INCOME INSECURITY: HOW HARD IS IT FOR YOU TO PAY FOR THE VERY BASICS LIKE FOOD, HOUSING, MEDICAL CARE, AND HEATING?: NOT HARD AT ALL

## 2023-08-21 SDOH — ECONOMIC STABILITY: HOUSING INSECURITY
IN THE LAST 12 MONTHS, WAS THERE A TIME WHEN YOU DID NOT HAVE A STEADY PLACE TO SLEEP OR SLEPT IN A SHELTER (INCLUDING NOW)?: NO

## 2023-08-21 ASSESSMENT — ENCOUNTER SYMPTOMS
VOMITING: 1
ABDOMINAL PAIN: 1
COUGH: 0
NAUSEA: 1
DIARRHEA: 1

## 2023-08-21 NOTE — PROGRESS NOTES
Amena Jones (:  1960) is a 66 y.o. male,Established patient, here for evaluation of the following chief complaint(s):  Emesis (Pt c/o vomiting x 5 days ), Diarrhea (Pt c/o diarrhea x 5 days ), and Other (Due for shingles vaccine, diabetic eye exam and micro)       ASSESSMENT/PLAN:  1. Nausea and vomiting, unspecified vomiting type  -     US ABDOMEN LIMITED; Future  -     CBC with Auto Differential  -     Comprehensive Metabolic Panel  2. Epigastric pain  -     US ABDOMEN LIMITED; Future  -     CBC with Auto Differential  -     Comprehensive Metabolic Panel  3. Type 2 diabetes mellitus with diabetic neuropathy, unspecified whether long term insulin use ([address])  -     POCT microalbumin    Plan will be determined based on ultrasound and labs   Ruling out pancreatitis based on signs, symptoms and diabetes medication use     Return if symptoms worsen or fail to improve. Subjective   SUBJECTIVE/OBJECTIVE:  Feels drained at this time  Tuesday evening began with vomiting and diarrhea   He had been fighting medications issues for diabetes, trulicity issues, weekly injection on  and felt nausea/vomiting/diarrhea   He used Pedialyte as well  He stopped taking his diabetes medications last Tuesday due to nausea/vomiting/diarrhea   Weight loss 3-5 pounds  Started feeling better yesterday, but hasn't taken any medications or eaten     Emesis   This is a new problem. The current episode started in the past 7 days. The problem has been waxing and waning. Associated symptoms include abdominal pain, diarrhea and weight loss. Pertinent negatives include no chills, coughing, fever or sweats. Diarrhea   This is a new problem. The current episode started in the past 7 days. Associated symptoms include abdominal pain, vomiting and weight loss. Pertinent negatives include no chills, coughing, fever or sweats.      Review of Systems   Constitutional:  Positive for activity change, appetite change, fatigue and

## 2023-08-21 NOTE — PATIENT INSTRUCTIONS

## 2023-08-21 NOTE — TELEPHONE ENCOUNTER
From: Cassandra Cannon  To: Dr. Falguni Waters: 8/19/2023 2:16 PM EDT  Subject: Stomach Issues    I know we discussed at previous appt. maybe an issue with medicine causing stomach issues, not really sure that is the case though. I have had diarrhea and vomiting since Tuesday 8/15/23. Cannot keep anything down, goes right through me. My stomach is hard. No fever, body aches etc. except stomach. Can't keep medications down, have tried Ondansetron Odt tabs 4mg, and Imodium and still vomiting and diarrhea. Feel dehydrated, weak and wore out. I just dont know what may be causing this . My weight is 187 been along time since I've been this. I try bland foods and and broths and jellos just once it goes down it comes back out. Any ideas?

## 2023-09-21 ENCOUNTER — OFFICE VISIT (OUTPATIENT)
Dept: PSYCHIATRY | Age: 63
End: 2023-09-21
Payer: COMMERCIAL

## 2023-09-21 ENCOUNTER — PATIENT MESSAGE (OUTPATIENT)
Dept: FAMILY MEDICINE CLINIC | Age: 63
End: 2023-09-21

## 2023-09-21 VITALS
SYSTOLIC BLOOD PRESSURE: 126 MMHG | DIASTOLIC BLOOD PRESSURE: 88 MMHG | BODY MASS INDEX: 26.88 KG/M2 | WEIGHT: 192 LBS | HEIGHT: 71 IN

## 2023-09-21 DIAGNOSIS — F41.1 GENERALIZED ANXIETY DISORDER: ICD-10-CM

## 2023-09-21 PROCEDURE — 3079F DIAST BP 80-89 MM HG: CPT | Performed by: NURSE PRACTITIONER

## 2023-09-21 PROCEDURE — 3074F SYST BP LT 130 MM HG: CPT | Performed by: NURSE PRACTITIONER

## 2023-09-21 PROCEDURE — 99204 OFFICE O/P NEW MOD 45 MIN: CPT | Performed by: NURSE PRACTITIONER

## 2023-09-21 RX ORDER — BUSPIRONE HYDROCHLORIDE 5 MG/1
5 TABLET ORAL 2 TIMES DAILY
Qty: 60 TABLET | Refills: 0 | Status: SHIPPED | OUTPATIENT
Start: 2023-09-21

## 2023-09-21 ASSESSMENT — PATIENT HEALTH QUESTIONNAIRE - PHQ9
8. MOVING OR SPEAKING SO SLOWLY THAT OTHER PEOPLE COULD HAVE NOTICED. OR THE OPPOSITE, BEING SO FIGETY OR RESTLESS THAT YOU HAVE BEEN MOVING AROUND A LOT MORE THAN USUAL: 2
SUM OF ALL RESPONSES TO PHQ QUESTIONS 1-9: 17
10. IF YOU CHECKED OFF ANY PROBLEMS, HOW DIFFICULT HAVE THESE PROBLEMS MADE IT FOR YOU TO DO YOUR WORK, TAKE CARE OF THINGS AT HOME, OR GET ALONG WITH OTHER PEOPLE: 1
5. POOR APPETITE OR OVEREATING: 2
7. TROUBLE CONCENTRATING ON THINGS, SUCH AS READING THE NEWSPAPER OR WATCHING TELEVISION: 2
SUM OF ALL RESPONSES TO PHQ QUESTIONS 1-9: 17
6. FEELING BAD ABOUT YOURSELF - OR THAT YOU ARE A FAILURE OR HAVE LET YOURSELF OR YOUR FAMILY DOWN: 2
2. FEELING DOWN, DEPRESSED OR HOPELESS: 2
SUM OF ALL RESPONSES TO PHQ9 QUESTIONS 1 & 2: 5
SUM OF ALL RESPONSES TO PHQ QUESTIONS 1-9: 17
3. TROUBLE FALLING OR STAYING ASLEEP: 2
SUM OF ALL RESPONSES TO PHQ QUESTIONS 1-9: 17
1. LITTLE INTEREST OR PLEASURE IN DOING THINGS: 3
4. FEELING TIRED OR HAVING LITTLE ENERGY: 2
9. THOUGHTS THAT YOU WOULD BE BETTER OFF DEAD, OR OF HURTING YOURSELF: 0

## 2023-09-21 ASSESSMENT — ANXIETY QUESTIONNAIRES
1. FEELING NERVOUS, ANXIOUS, OR ON EDGE: 2
7. FEELING AFRAID AS IF SOMETHING AWFUL MIGHT HAPPEN: 0
GAD7 TOTAL SCORE: 12
4. TROUBLE RELAXING: 2
5. BEING SO RESTLESS THAT IT IS HARD TO SIT STILL: 2
3. WORRYING TOO MUCH ABOUT DIFFERENT THINGS: 2
IF YOU CHECKED OFF ANY PROBLEMS ON THIS QUESTIONNAIRE, HOW DIFFICULT HAVE THESE PROBLEMS MADE IT FOR YOU TO DO YOUR WORK, TAKE CARE OF THINGS AT HOME, OR GET ALONG WITH OTHER PEOPLE: SOMEWHAT DIFFICULT
2. NOT BEING ABLE TO STOP OR CONTROL WORRYING: 2
6. BECOMING EASILY ANNOYED OR IRRITABLE: 2

## 2023-09-21 NOTE — TELEPHONE ENCOUNTER
From: Lorraine Wilburn  To: Dr. Hurd Dora: 9/21/2023 1:30 PM EDT  Subject: Medical Forms    My work is wanting this health assessment form filled out , for both Jacklyn Maxwell and I , information can be used from recent visits this year , please let me know if you can fill this form out for me , please let me know.       Thank Bina Rouse  [phone] Cell

## 2023-09-21 NOTE — PROGRESS NOTES
a 66 y.o. male with h/o Anxiety  who p/t office to establish care with this provider. Patient presents today with ongoing concerns of worsening anxiety, mood control. States that he has become more irritable and agitated over the past couple of years. Started to occur during [place] and he started to have to work from home. Seems that he lost his sense of routine and this worsened his moods. He works for [employer] and another part time job as a . He works nearly 7 days a week taking very minimal time off. Wife has become concerned about his moods, \"I seem to take my frustrations out on her. \" Frustration stems from work, he does have frequent concerns and worries about his diabetes control and the amount of medications that he takes from this. He does not sleep well at all. Poor motivation and energy. Perhaps ADHD symptoms but does not want to be medicated for this. Denies AV hallucinations. Denies Paranoia. Denies Delusions. Denies SI. Denies HI. Review of Systems - Psychological ROS: positive for - anxiety, irritability   Respiratory ROS: no cough, shortness of breath, or wheezing  Cardiovascular ROS: no chest pain or dyspnea on exertion  Gastrointestinal ROS: no abdominal pain, change in bowel habits, or black or bloody stools  Genito-Urinary ROS: no dysuria, trouble voiding, or hematuria  Musculoskeletal ROS: negative  Neurological ROS: no TIA or stroke symptoms     Psych ROS:   Depression:       Sleep Disturbance: Poor sleep. Not getting enough sleep. Appetite Issues: No issues      Concentration/Focus Issues: Yes, daily      Low Motivation/Energy: Poor      Isolating: Yes      Difficulty with ADL completion: Sometimes      Suicidal Ideation/Homicidal Ideation: Denies  Kristina: + Irritability, Agitation. Insomnia with increased energy. Racing thoughts. Denies rapid speech. Denies grandiose behaviors.    Anxiety:      Constant worry: Yes- worries about Diabetes not being controlled

## 2023-09-27 LAB
Lab: NORMAL
REPORT: NORMAL
THIS TEST SENT TO: NORMAL

## 2023-10-17 ENCOUNTER — OFFICE VISIT (OUTPATIENT)
Dept: FAMILY MEDICINE CLINIC | Age: 63
End: 2023-10-17
Payer: COMMERCIAL

## 2023-10-17 VITALS
HEART RATE: 70 BPM | OXYGEN SATURATION: 96 % | RESPIRATION RATE: 16 BRPM | HEIGHT: 71 IN | SYSTOLIC BLOOD PRESSURE: 118 MMHG | WEIGHT: 203 LBS | DIASTOLIC BLOOD PRESSURE: 74 MMHG | BODY MASS INDEX: 28.42 KG/M2

## 2023-10-17 DIAGNOSIS — Z79.4 TYPE 2 DIABETES MELLITUS WITH HYPERGLYCEMIA, WITH LONG-TERM CURRENT USE OF INSULIN (HCC): Primary | ICD-10-CM

## 2023-10-17 DIAGNOSIS — Z12.5 SCREENING FOR MALIGNANT NEOPLASM OF PROSTATE: ICD-10-CM

## 2023-10-17 DIAGNOSIS — E11.65 TYPE 2 DIABETES MELLITUS WITH HYPERGLYCEMIA, WITH LONG-TERM CURRENT USE OF INSULIN (HCC): Primary | ICD-10-CM

## 2023-10-17 DIAGNOSIS — Z23 INFLUENZA VACCINE NEEDED: ICD-10-CM

## 2023-10-17 LAB
ALBUMIN SERPL-MCNC: 4.3 G/DL (ref 3.4–5)
ALBUMIN/GLOB SERPL: 2.2 {RATIO} (ref 1.1–2.2)
ALP SERPL-CCNC: 87 U/L (ref 40–129)
ALT SERPL-CCNC: 26 U/L (ref 10–40)
ANION GAP SERPL CALCULATED.3IONS-SCNC: 13 MMOL/L (ref 3–16)
AST SERPL-CCNC: 20 U/L (ref 15–37)
BILIRUB SERPL-MCNC: 0.4 MG/DL (ref 0–1)
BUN SERPL-MCNC: 13 MG/DL (ref 7–20)
CALCIUM SERPL-MCNC: 8.8 MG/DL (ref 8.3–10.6)
CHLORIDE SERPL-SCNC: 101 MMOL/L (ref 99–110)
CHOLEST SERPL-MCNC: 133 MG/DL (ref 0–199)
CO2 SERPL-SCNC: 25 MMOL/L (ref 21–32)
CREAT SERPL-MCNC: 0.7 MG/DL (ref 0.8–1.3)
CREATININE URINE POCT: NORMAL
GFR SERPLBLD CREATININE-BSD FMLA CKD-EPI: >60 ML/MIN/{1.73_M2}
GLUCOSE SERPL-MCNC: 134 MG/DL (ref 70–99)
HBA1C MFR BLD: 9.7 %
HDLC SERPL-MCNC: 38 MG/DL (ref 40–60)
LDLC SERPL CALC-MCNC: 58 MG/DL
MICROALBUMIN/CREAT 24H UR: NORMAL MG/G{CREAT}
MICROALBUMIN/CREAT UR-RTO: NORMAL
POTASSIUM SERPL-SCNC: 4.2 MMOL/L (ref 3.5–5.1)
PROT SERPL-MCNC: 6.3 G/DL (ref 6.4–8.2)
PSA SERPL DL<=0.01 NG/ML-MCNC: 0.68 NG/ML (ref 0–4)
SODIUM SERPL-SCNC: 139 MMOL/L (ref 136–145)
TRIGL SERPL-MCNC: 186 MG/DL (ref 0–150)
VLDLC SERPL CALC-MCNC: 37 MG/DL

## 2023-10-17 PROCEDURE — 90674 CCIIV4 VAC NO PRSV 0.5 ML IM: CPT

## 2023-10-17 PROCEDURE — 82044 UR ALBUMIN SEMIQUANTITATIVE: CPT

## 2023-10-17 PROCEDURE — 83036 HEMOGLOBIN GLYCOSYLATED A1C: CPT

## 2023-10-17 PROCEDURE — 3074F SYST BP LT 130 MM HG: CPT

## 2023-10-17 PROCEDURE — 3046F HEMOGLOBIN A1C LEVEL >9.0%: CPT

## 2023-10-17 PROCEDURE — 3078F DIAST BP <80 MM HG: CPT

## 2023-10-17 PROCEDURE — 90471 IMMUNIZATION ADMIN: CPT

## 2023-10-17 PROCEDURE — 99214 OFFICE O/P EST MOD 30 MIN: CPT

## 2023-10-17 RX ORDER — PIOGLITAZONEHYDROCHLORIDE 45 MG/1
45 TABLET ORAL DAILY
Qty: 90 TABLET | Refills: 0 | Status: SHIPPED | OUTPATIENT
Start: 2023-10-17

## 2023-10-17 ASSESSMENT — ENCOUNTER SYMPTOMS
VOMITING: 0
EYE PAIN: 0
CHEST TIGHTNESS: 0
COUGH: 0
SINUS PAIN: 0
SHORTNESS OF BREATH: 0
CONSTIPATION: 0
NAUSEA: 0
WHEEZING: 0
EYE DISCHARGE: 0
EYE REDNESS: 0
ABDOMINAL PAIN: 0
SORE THROAT: 0
ABDOMINAL DISTENTION: 0
DIARRHEA: 0
SINUS PRESSURE: 0
COLOR CHANGE: 0

## 2023-10-17 NOTE — PATIENT INSTRUCTIONS

## 2023-10-17 NOTE — PROGRESS NOTES
Immunizations Administered       Name Date Dose Route    Influenza, FLUCELVAX, (age 10 mo+), MDCK, PF, 0.5mL 10/17/2023 0.5 mL Intramuscular    Site: Deltoid- Left    Lot: [id number]    NDC: 00023-077-75
overweight. He is not diaphoretic. HENT:      Head: Normocephalic and atraumatic. Right Ear: Tympanic membrane, ear canal and external ear normal.      Left Ear: Tympanic membrane, ear canal and external ear normal.      Nose: Nose normal.      Mouth/Throat:      Mouth: Mucous membranes are moist.      Pharynx: Oropharynx is clear. No posterior oropharyngeal erythema. Eyes:      General: No scleral icterus. Right eye: No discharge. Left eye: No discharge. Extraocular Movements: Extraocular movements intact. Pupils: Pupils are equal, round, and reactive to light. Neck:      Vascular: No carotid bruit. Cardiovascular:      Rate and Rhythm: Normal rate and regular rhythm. Pulses: Normal pulses. Heart sounds: Normal heart sounds. No murmur heard. No friction rub. No gallop. Pulmonary:      Effort: Pulmonary effort is normal. No respiratory distress. Breath sounds: Normal breath sounds. No wheezing. Abdominal:      General: Bowel sounds are normal. There is no distension. Palpations: Abdomen is soft. Tenderness: There is no abdominal tenderness. There is no right CVA tenderness, left CVA tenderness, guarding or rebound. Musculoskeletal:         General: No swelling or tenderness. Normal range of motion. Cervical back: Normal range of motion and neck supple. Right lower leg: No edema. Left lower leg: No edema. Skin:     General: Skin is warm and dry. Capillary Refill: Capillary refill takes less than 2 seconds. Coloration: Skin is not jaundiced or pale. Findings: No bruising or erythema. Neurological:      Mental Status: He is alert and oriented to person, place, and time. Mental status is at baseline. Motor: No weakness. Gait: Gait normal.   Psychiatric:         Mood and Affect: Mood normal.         Behavior: Behavior normal.         Thought Content:  Thought content normal.         Judgment: Judgment

## 2023-11-06 RX ORDER — BUSPIRONE HYDROCHLORIDE 10 MG/1
10 TABLET ORAL 3 TIMES DAILY
Qty: 30 TABLET | Refills: 2 | Status: SHIPPED | OUTPATIENT
Start: 2023-11-06

## 2023-11-20 ENCOUNTER — PATIENT MESSAGE (OUTPATIENT)
Dept: FAMILY MEDICINE CLINIC | Age: 63
End: 2023-11-20

## 2023-11-20 RX ORDER — OMEPRAZOLE 20 MG/1
20 CAPSULE, DELAYED RELEASE ORAL DAILY
Qty: 90 CAPSULE | Refills: 3 | Status: CANCELLED | OUTPATIENT
Start: 2023-11-20

## 2023-11-20 NOTE — TELEPHONE ENCOUNTER
From: Daisy Inman  To: Darling Castillo  Sent: 11/20/2023 8:54 AM EST  Subject: omeprazole    i need a refill for prilosec (omeprazole) [facility]  Please let me know    Thank Anastacia Pacheco

## 2023-11-21 RX ORDER — OMEPRAZOLE 20 MG/1
20 CAPSULE, DELAYED RELEASE ORAL DAILY
Qty: 90 CAPSULE | Refills: 1 | Status: SHIPPED | OUTPATIENT
Start: 2023-11-21

## 2024-01-02 RX ORDER — BUSPIRONE HYDROCHLORIDE 10 MG/1
10 TABLET ORAL 3 TIMES DAILY
Qty: 90 TABLET | Refills: 2 | Status: SHIPPED | OUTPATIENT
Start: 2024-01-02 | End: 2024-04-01

## 2024-01-05 DIAGNOSIS — Z79.4 TYPE 2 DIABETES MELLITUS WITH HYPERGLYCEMIA, WITH LONG-TERM CURRENT USE OF INSULIN (HCC): ICD-10-CM

## 2024-01-05 DIAGNOSIS — E11.65 TYPE 2 DIABETES MELLITUS WITH HYPERGLYCEMIA, WITH LONG-TERM CURRENT USE OF INSULIN (HCC): ICD-10-CM

## 2024-01-05 RX ORDER — PIOGLITAZONEHYDROCHLORIDE 45 MG/1
45 TABLET ORAL DAILY
Qty: 90 TABLET | Refills: 0 | Status: SHIPPED | OUTPATIENT
Start: 2024-01-05

## 2024-01-05 NOTE — TELEPHONE ENCOUNTER
Need clarification for pharmacy.  Pioglitazone 30mg or 45mg.  Last note listed as 45mg on 10/17/23.  45mg pended.  AM

## 2024-01-06 ENCOUNTER — PATIENT MESSAGE (OUTPATIENT)
Dept: FAMILY MEDICINE CLINIC | Age: 64
End: 2024-01-06

## 2024-01-08 NOTE — TELEPHONE ENCOUNTER
From: Edgar Ross  To: Luke A Glischinski  Sent: 1/6/2024 5:39 PM EST  Subject: Coverage    I received a letter from my Insurance company that states that Luke Glischinski has left the network and would need to find another provider to receive in-network care. I have an appointment scheduled for January 16,2024 was wondering what was going on. Thank you Edgar

## 2024-03-12 ENCOUNTER — PATIENT MESSAGE (OUTPATIENT)
Dept: FAMILY MEDICINE CLINIC | Age: 64
End: 2024-03-12

## 2024-03-12 NOTE — TELEPHONE ENCOUNTER
From: Edgar Ross  To: Luke A Glischinski  Sent: 3/12/2024 4:43 PM EDT  Subject: Coverage    I wrote back in January in regards to a letter I received from my insurance company in regards to Franck being an out of network provider and I would not be covered for visits to him. I called my insurance company could tell me is that they did not have the corrected needed paperwork from him and I would have to contact their office.I I had to cancel my appointment in January. I contacted the office and they replied to me that they are waiting. You could go back to the messages to see that. It is now March and have gotten no where as to what's going on. Any suggestions? With Dr. Esparza not being there we have no PCP and have not heard back from your office.

## 2024-04-15 RX ORDER — BUSPIRONE HYDROCHLORIDE 10 MG/1
10 TABLET ORAL 3 TIMES DAILY
Qty: 90 TABLET | Refills: 11 | OUTPATIENT
Start: 2024-04-15

## 2024-05-01 RX ORDER — BUSPIRONE HYDROCHLORIDE 10 MG/1
10 TABLET ORAL 3 TIMES DAILY
Qty: 270 TABLET | Refills: 0 | Status: SHIPPED | OUTPATIENT
Start: 2024-05-01 | End: 2024-07-30

## 2024-05-01 RX ORDER — BUSPIRONE HYDROCHLORIDE 10 MG/1
10 TABLET ORAL 2 TIMES DAILY
Qty: 60 TABLET | Refills: 0 | Status: SHIPPED | OUTPATIENT
Start: 2024-05-01 | End: 2024-05-01

## 2024-05-30 DIAGNOSIS — Z79.4 TYPE 2 DIABETES MELLITUS WITH HYPERGLYCEMIA, WITH LONG-TERM CURRENT USE OF INSULIN (HCC): ICD-10-CM

## 2024-05-30 DIAGNOSIS — E11.65 TYPE 2 DIABETES MELLITUS WITH HYPERGLYCEMIA, WITH LONG-TERM CURRENT USE OF INSULIN (HCC): ICD-10-CM

## 2024-05-31 RX ORDER — PIOGLITAZONEHYDROCHLORIDE 45 MG/1
45 TABLET ORAL DAILY
Qty: 90 TABLET | Refills: 0 | Status: SHIPPED | OUTPATIENT
Start: 2024-05-31

## 2024-06-18 RX ORDER — OMEPRAZOLE 20 MG/1
20 CAPSULE, DELAYED RELEASE ORAL DAILY
Qty: 90 CAPSULE | Refills: 1 | Status: SHIPPED | OUTPATIENT
Start: 2024-06-18

## 2024-06-20 ENCOUNTER — OFFICE VISIT (OUTPATIENT)
Dept: PSYCHIATRY | Age: 64
End: 2024-06-20
Payer: COMMERCIAL

## 2024-06-20 DIAGNOSIS — F41.1 GENERALIZED ANXIETY DISORDER: Primary | ICD-10-CM

## 2024-06-20 PROCEDURE — 99214 OFFICE O/P EST MOD 30 MIN: CPT | Performed by: NURSE PRACTITIONER

## 2024-06-20 RX ORDER — BUSPIRONE HYDROCHLORIDE 10 MG/1
TABLET ORAL
Qty: 360 TABLET | Refills: 0
Start: 2024-06-20

## 2024-06-20 ASSESSMENT — PATIENT HEALTH QUESTIONNAIRE - PHQ9
6. FEELING BAD ABOUT YOURSELF - OR THAT YOU ARE A FAILURE OR HAVE LET YOURSELF OR YOUR FAMILY DOWN: SEVERAL DAYS
SUM OF ALL RESPONSES TO PHQ9 QUESTIONS 1 & 2: 5
SUM OF ALL RESPONSES TO PHQ QUESTIONS 1-9: 13
SUM OF ALL RESPONSES TO PHQ QUESTIONS 1-9: 13
5. POOR APPETITE OR OVEREATING: MORE THAN HALF THE DAYS
2. FEELING DOWN, DEPRESSED OR HOPELESS: MORE THAN HALF THE DAYS
SUM OF ALL RESPONSES TO PHQ QUESTIONS 1-9: 13
9. THOUGHTS THAT YOU WOULD BE BETTER OFF DEAD, OR OF HURTING YOURSELF: NOT AT ALL
SUM OF ALL RESPONSES TO PHQ QUESTIONS 1-9: 13
3. TROUBLE FALLING OR STAYING ASLEEP: MORE THAN HALF THE DAYS
1. LITTLE INTEREST OR PLEASURE IN DOING THINGS: NEARLY EVERY DAY
7. TROUBLE CONCENTRATING ON THINGS, SUCH AS READING THE NEWSPAPER OR WATCHING TELEVISION: SEVERAL DAYS
4. FEELING TIRED OR HAVING LITTLE ENERGY: MORE THAN HALF THE DAYS
8. MOVING OR SPEAKING SO SLOWLY THAT OTHER PEOPLE COULD HAVE NOTICED. OR THE OPPOSITE, BEING SO FIGETY OR RESTLESS THAT YOU HAVE BEEN MOVING AROUND A LOT MORE THAN USUAL: NOT AT ALL

## 2024-06-20 ASSESSMENT — ANXIETY QUESTIONNAIRES
2. NOT BEING ABLE TO STOP OR CONTROL WORRYING: 2-OVER HALF THE DAYS
4. TROUBLE RELAXING: 3-NEARLY EVERY DAY
7. FEELING AFRAID AS IF SOMETHING AWFUL MIGHT HAPPEN: 1-SEVERAL DAYS
1. FEELING NERVOUS, ANXIOUS, OR ON EDGE: MORE THAN HALF THE DAYS
3. WORRYING TOO MUCH ABOUT DIFFERENT THINGS: 2-OVER HALF THE DAYS
GAD7 TOTAL SCORE: 14
6. BECOMING EASILY ANNOYED OR IRRITABLE: 1-SEVERAL DAYS
5. BEING SO RESTLESS THAT IT IS HARD TO SIT STILL: 3-NEARLY EVERY DAY

## 2024-06-20 NOTE — PROGRESS NOTES
09/21/23 87.1 kg (192 lb)   08/21/23 86.5 kg (190 lb 9.6 oz)       There were no vitals filed for this visit.      Mental Status Exam:   Appearance    alert, cooperative, appropriate dress for season, well groomed, appears stated age  Muscle strength/tone: no atrophy or abnormal movements  Gait/station: normal  Speech    spontaneous, normal rate, and normal volume  Mood    \"Better\"  Affect    anxiety Congruent to thought content and mood  Thought Content    hopelessness and helplessness, no delusions voiced  Thought Process    linear and goal directed   Associations    logical connections  Perceptions: denies AH/VH, does not appear preoccupied with the internal environment  Insight    Good  Judgment    Intact  Orientation    oriented to person, place, time, and general circumstances  Memory    recent and remote memory intact  Attention/Concentration    intact  Ability to understand instructions Yes  Ability to respond meaningfully Yes  Language: Fluent  Fund of knowledge/Intellect: Average  SI:   no suicidal ideation  HI: Denies HI    Labs:     Office Visit on 10/17/2023   Component Date Value Ref Range Status    Microalb, Ur 10/17/2023 10 mg/L   Final    Creatinine Ur POCT 10/17/2023 200 mg/dL   Final    Microalb/Creat Ratio 10/17/2023 <30 mg/g   Final    Hemoglobin A1C 10/17/2023 9.7  % Final    PSA 10/17/2023 0.68  0.00 - 4.00 ng/mL Final    Cholesterol, Total 10/17/2023 133  0 - 199 mg/dL Final    Triglycerides 10/17/2023 186 (H)  0 - 150 mg/dL Final    HDL 10/17/2023 38 (L)  40 - 60 mg/dL Final    Comment: An HDL cholesterol less than 40 mg/dL is low and  constitutes a coronary heart disease risk factor.  An HDL cholesterol greater than 60 mg/dL is a  negative risk factor for coronary heart disease.      LDL Calculated 10/17/2023 58  <100 mg/dL Final    VLDL Cholesterol Calculated 10/17/2023 37  Not Established mg/dL Final    Sodium 10/17/2023 139  136 - 145 mmol/L Final    Potassium 10/17/2023 4.2  3.5 - 5.1

## 2024-07-05 ENCOUNTER — TELEPHONE (OUTPATIENT)
Dept: FAMILY MEDICINE CLINIC | Age: 64
End: 2024-07-05

## 2024-07-05 RX ORDER — ICOSAPENT ETHYL 1 G/1
2 CAPSULE ORAL 2 TIMES DAILY
Qty: 360 CAPSULE | Refills: 3 | Status: SHIPPED | OUTPATIENT
Start: 2024-07-05

## 2024-07-12 DIAGNOSIS — F41.1 GENERALIZED ANXIETY DISORDER: ICD-10-CM

## 2024-07-12 RX ORDER — BUSPIRONE HYDROCHLORIDE 10 MG/1
TABLET ORAL
Qty: 270 TABLET | Refills: 3 | Status: SHIPPED | OUTPATIENT
Start: 2024-07-12

## 2024-07-30 NOTE — PATIENT INSTRUCTIONS
4 P's: pizza, pasta, potatoes, pop; breads, sweet, rice    Protein and unsaturated fats. Avoid trans fats      Health goals: Weight loss goal of 5-10% of your current body weight with 1-2 pounds of weight loss per week; drink at least 64 ounces of water a day, exercise at least 150 minutes/week, sleep between 6 to 10 hours nightly, consume approximately 2,000 calories daily, and limit toxins in the diet (alcohol, drugs, smoking).      GENERAL OFFICE POLICIES      Telephone Calls: Messages will be answered within 1-2 business days, unless the provider is out of the office.  If it is urgent a covering provider will answer. (this does not include Medication refills).    MyChart:  We recommend all patients sign up for [employer].  Through this portal you can see your lab results, request refills, schedule appointments, pay your bill and send messages to the office.   [employer] messages will be answered within 1-2 business days unless the provider is out of the office.  For urgent matters, please call the office.  Appointments:  All appointments must be scheduled.  We ask all patients to schedule their next follow up appointment before they leave the office to make sure you will be able to be seen before you run out of medications.  24 hours notice is required to cancel or reschedule an appointment to avoid being marked as a no show.  You may be dismissed from the practice after 3 no shows.    LATE for Appointment: If you are 15 or more minutes late for your appointment, you may be asked to reschedule.  MA/LAB APPTS: Must be scheduled, cannot accept walk in lab visits.  We only draw labs for patients established in our office.  We only do injections for medications ordered by our office.  Acute Sick Visits:  Nothing other than acute complaint will be addressed at this visit.  TRADITIONAL MEDICARE  DOES NOT COVER PHYSICALS  MEDICARE WELLNESS VISITS: These are NOT physicals but the free annual visit offered by Medicare to

## 2024-08-01 ENCOUNTER — OFFICE VISIT (OUTPATIENT)
Dept: FAMILY MEDICINE CLINIC | Age: 64
End: 2024-08-01

## 2024-08-01 VITALS
SYSTOLIC BLOOD PRESSURE: 128 MMHG | HEART RATE: 75 BPM | WEIGHT: 212.2 LBS | BODY MASS INDEX: 29.71 KG/M2 | OXYGEN SATURATION: 95 % | HEIGHT: 71 IN | DIASTOLIC BLOOD PRESSURE: 76 MMHG

## 2024-08-01 DIAGNOSIS — S91.302A OPEN WOUND OF LEFT FOOT, INITIAL ENCOUNTER: ICD-10-CM

## 2024-08-01 DIAGNOSIS — Z79.4 TYPE 2 DIABETES MELLITUS WITH HYPERGLYCEMIA, WITH LONG-TERM CURRENT USE OF INSULIN (HCC): Primary | ICD-10-CM

## 2024-08-01 DIAGNOSIS — F41.1 GENERALIZED ANXIETY DISORDER: ICD-10-CM

## 2024-08-01 DIAGNOSIS — E11.65 TYPE 2 DIABETES MELLITUS WITH HYPERGLYCEMIA, WITH LONG-TERM CURRENT USE OF INSULIN (HCC): Primary | ICD-10-CM

## 2024-08-01 DIAGNOSIS — E11.40 TYPE 2 DIABETES MELLITUS WITH DIABETIC NEUROPATHY, WITH LONG-TERM CURRENT USE OF INSULIN (HCC): ICD-10-CM

## 2024-08-01 DIAGNOSIS — Z79.4 TYPE 2 DIABETES MELLITUS WITH DIABETIC NEUROPATHY, WITH LONG-TERM CURRENT USE OF INSULIN (HCC): ICD-10-CM

## 2024-08-01 DIAGNOSIS — F32.5 MAJOR DEPRESSION IN REMISSION (HCC): ICD-10-CM

## 2024-08-01 DIAGNOSIS — I10 ESSENTIAL HYPERTENSION, BENIGN: ICD-10-CM

## 2024-08-01 LAB — HBA1C MFR BLD: 8.2 %

## 2024-08-01 RX ORDER — ROSUVASTATIN CALCIUM 10 MG/1
10 TABLET, COATED ORAL NIGHTLY
Qty: 90 TABLET | Refills: 3 | Status: SHIPPED | OUTPATIENT
Start: 2024-08-01

## 2024-08-01 RX ORDER — INSULIN GLARGINE-YFGN 100 [IU]/ML
INJECTION, SOLUTION SUBCUTANEOUS
Qty: 45 ML | Refills: 3 | Status: SHIPPED | OUTPATIENT
Start: 2024-08-01

## 2024-08-01 RX ORDER — FLURBIPROFEN SODIUM 0.3 MG/ML
SOLUTION/ DROPS OPHTHALMIC
Qty: 100 EACH | Refills: 5 | Status: SHIPPED | OUTPATIENT
Start: 2024-08-01

## 2024-08-01 RX ORDER — BLOOD SUGAR DIAGNOSTIC
STRIP MISCELLANEOUS
Qty: 50 STRIP | Refills: 5 | Status: SHIPPED | OUTPATIENT
Start: 2024-08-01

## 2024-08-01 RX ORDER — PAROXETINE 30 MG/1
TABLET, FILM COATED ORAL
Qty: 180 TABLET | Refills: 3 | Status: SHIPPED | OUTPATIENT
Start: 2024-08-01

## 2024-08-01 RX ORDER — PIOGLITAZONEHYDROCHLORIDE 45 MG/1
45 TABLET ORAL DAILY
Qty: 90 TABLET | Refills: 0 | Status: SHIPPED | OUTPATIENT
Start: 2024-08-01

## 2024-08-01 RX ORDER — METFORMIN HYDROCHLORIDE 500 MG/1
TABLET, EXTENDED RELEASE ORAL
Qty: 360 TABLET | Refills: 3 | Status: SHIPPED | OUTPATIENT
Start: 2024-08-01

## 2024-08-01 RX ORDER — IRBESARTAN 150 MG/1
150 TABLET ORAL NIGHTLY
Qty: 90 TABLET | Refills: 3 | Status: SHIPPED | OUTPATIENT
Start: 2024-08-01

## 2024-08-01 RX ORDER — PROPRANOLOL HCL 60 MG
CAPSULE, EXTENDED RELEASE 24HR ORAL
Qty: 90 CAPSULE | Refills: 3 | Status: SHIPPED | OUTPATIENT
Start: 2024-08-01

## 2024-08-01 RX ORDER — CEPHALEXIN 500 MG/1
500 CAPSULE ORAL 3 TIMES DAILY
Qty: 30 CAPSULE | Refills: 0 | Status: SHIPPED | OUTPATIENT
Start: 2024-08-01 | End: 2024-08-11

## 2024-08-01 RX ORDER — DAPAGLIFLOZIN 10 MG/1
10 TABLET, FILM COATED ORAL EVERY MORNING
Qty: 90 TABLET | Refills: 3 | Status: SHIPPED | OUTPATIENT
Start: 2024-08-01

## 2024-08-01 ASSESSMENT — ENCOUNTER SYMPTOMS
CHEST TIGHTNESS: 0
COUGH: 0
PHOTOPHOBIA: 0
VOMITING: 0
WHEEZING: 0
CONSTIPATION: 0
BACK PAIN: 0
EYE ITCHING: 0
ABDOMINAL DISTENTION: 0
DIARRHEA: 0
ABDOMINAL PAIN: 0
NAUSEA: 0
SHORTNESS OF BREATH: 0
COLOR CHANGE: 0
EYE PAIN: 0

## 2024-08-01 NOTE — PROGRESS NOTES
Edgar Ross (:  1960) is a 64 y.o. male,Established patient, here for evaluation of the following chief complaint(s):  Diabetes (Routine follow up for DM, A1C DUE ) and Skin Problem (Pt C/O Left foot swelling, skin peeling and numbness x 3-4 days )      Assessment & Plan   1. Type 2 diabetes mellitus with hyperglycemia, with long-term current use of insulin ([facility])  -A1c 8.2% today.  Incidentally improved without weekly GLP-1 injectable, likely due to limiting portion size and limiting late eating.  -Treatment option discussed.  Continue 80 units of insulin nightly, added 20 units of insulin in the mornings.  -Long discussion on lifestyle modifications, specifically diet and carbohydrates.  -Information provided in AVS.  -Follow-up in 3 months for diabetes.  Will have to titrate insulin up if still above 7.0%.    Visual inspection:  Deformity/amputation: absent  Skin lesions/pre-ulcerative calluses: present -wound left foot  Edema: right- negative, left- trace    Sensory exam:  Monofilament sensation: abnormal -scattered numbness bilaterally  (minimum of 5 random plantar locations tested, avoiding callused areas - > 1 area with absence of sensation is + for neuropathy)    Plus at least one of the following:  Pulses: normal,     -     POCT glycosylated hemoglobin (Hb A1C)  -      DIABETES FOOT EXAM  -     Insulin Glargine-yfgn (SEMGLEE, YFGN,) 100 UNIT/ML SOPN; Inject 80 Units into the skin at bedtime AND 20 Units daily., Disp-45 mL, R-3Normal  -     pioglitazone (ACTOS) 45 MG tablet; Take 1 tablet by mouth daily, Disp-90 tablet, R-0Normal  -     FARXIGA 10 MG tablet; Take 1 tablet by mouth every morning, Disp-90 tablet, R-3, DAWNormal  -     Insulin Pen Needle (B-D UF III MINI PEN NEEDLES) 31G X 5 MM MISC; Disp-100 each, R-5, NormalUSE AS DIRECTED WITH INSULIN  -     metFORMIN (GLUCOPHAGE-XR) 500 MG extended release tablet; TAKE 4 TABLETS DAILY WITH BREAKFAST, Disp-360 tablet, R-3Normal  -

## 2024-08-07 ENCOUNTER — PATIENT MESSAGE (OUTPATIENT)
Dept: FAMILY MEDICINE CLINIC | Age: 64
End: 2024-08-07

## 2024-08-07 NOTE — TELEPHONE ENCOUNTER
From: BRICE FITZGERALD  To: Edgar Ross  Sent: 8/7/2024 9:22 AM EDT  Subject: APPOINTMENT ON 8/20    [facility] has recently been aware that [facility] is no longer covering Dr. Esparza. We are working through this issue but are uncertain when it may be resolved. Your continuity of care is our priority. We would be happy to get your rescheduled with anyone of the other providers In the office if you can let us know what days and times work best for you.

## 2024-08-08 NOTE — TELEPHONE ENCOUNTER
Name: Edgar Ross MRN: [id number]   Date: 9/10/2024 Status: [facility]   Time: 3:00 PM Length: 40   Visit Type: OFFICE VISIT EXTENDED [2021] Copay: $0.00   Provider: Glischinski, Luke A, APRN - CNP Department: [facility]

## 2024-09-10 ENCOUNTER — OFFICE VISIT (OUTPATIENT)
Dept: FAMILY MEDICINE CLINIC | Age: 64
End: 2024-09-10
Payer: COMMERCIAL

## 2024-09-10 VITALS
DIASTOLIC BLOOD PRESSURE: 68 MMHG | HEIGHT: 71 IN | HEART RATE: 83 BPM | OXYGEN SATURATION: 93 % | WEIGHT: 214.6 LBS | BODY MASS INDEX: 30.04 KG/M2 | SYSTOLIC BLOOD PRESSURE: 100 MMHG

## 2024-09-10 DIAGNOSIS — E78.2 MIXED HYPERLIPIDEMIA: Chronic | ICD-10-CM

## 2024-09-10 DIAGNOSIS — Z23 NEED FOR TDAP VACCINATION: ICD-10-CM

## 2024-09-10 DIAGNOSIS — Z00.00 ENCOUNTER FOR WELL ADULT EXAM WITHOUT ABNORMAL FINDINGS: Primary | ICD-10-CM

## 2024-09-10 DIAGNOSIS — E55.9 VITAMIN D DEFICIENCY: ICD-10-CM

## 2024-09-10 DIAGNOSIS — Z12.5 SCREENING FOR MALIGNANT NEOPLASM OF PROSTATE: ICD-10-CM

## 2024-09-10 DIAGNOSIS — I10 ESSENTIAL HYPERTENSION, BENIGN: ICD-10-CM

## 2024-09-10 PROCEDURE — 90715 TDAP VACCINE 7 YRS/> IM: CPT

## 2024-09-10 PROCEDURE — 3074F SYST BP LT 130 MM HG: CPT

## 2024-09-10 PROCEDURE — 99214 OFFICE O/P EST MOD 30 MIN: CPT

## 2024-09-10 PROCEDURE — 99396 PREV VISIT EST AGE 40-64: CPT

## 2024-09-10 PROCEDURE — 90471 IMMUNIZATION ADMIN: CPT

## 2024-09-10 PROCEDURE — 3078F DIAST BP <80 MM HG: CPT

## 2024-09-10 SDOH — ECONOMIC STABILITY: FOOD INSECURITY: WITHIN THE PAST 12 MONTHS, THE FOOD YOU BOUGHT JUST DIDN'T LAST AND YOU DIDN'T HAVE MONEY TO GET MORE.: NEVER TRUE

## 2024-09-10 SDOH — ECONOMIC STABILITY: FOOD INSECURITY: WITHIN THE PAST 12 MONTHS, YOU WORRIED THAT YOUR FOOD WOULD RUN OUT BEFORE YOU GOT MONEY TO BUY MORE.: NEVER TRUE

## 2024-09-10 SDOH — ECONOMIC STABILITY: INCOME INSECURITY: HOW HARD IS IT FOR YOU TO PAY FOR THE VERY BASICS LIKE FOOD, HOUSING, MEDICAL CARE, AND HEATING?: NOT HARD AT ALL

## 2024-09-11 ENCOUNTER — LAB (OUTPATIENT)
Dept: FAMILY MEDICINE CLINIC | Age: 64
End: 2024-09-11
Payer: COMMERCIAL

## 2024-09-11 VITALS — WEIGHT: 215 LBS | HEIGHT: 70 IN | BODY MASS INDEX: 30.78 KG/M2

## 2024-09-11 DIAGNOSIS — E55.9 VITAMIN D DEFICIENCY: ICD-10-CM

## 2024-09-11 DIAGNOSIS — Z79.4 TYPE 2 DIABETES MELLITUS WITH DIABETIC NEUROPATHY, WITH LONG-TERM CURRENT USE OF INSULIN (HCC): ICD-10-CM

## 2024-09-11 DIAGNOSIS — Z79.4 TYPE 2 DIABETES MELLITUS WITH HYPERGLYCEMIA, WITH LONG-TERM CURRENT USE OF INSULIN (HCC): ICD-10-CM

## 2024-09-11 DIAGNOSIS — E11.40 TYPE 2 DIABETES MELLITUS WITH DIABETIC NEUROPATHY, WITH LONG-TERM CURRENT USE OF INSULIN (HCC): ICD-10-CM

## 2024-09-11 DIAGNOSIS — E11.65 TYPE 2 DIABETES MELLITUS WITH HYPERGLYCEMIA, WITH LONG-TERM CURRENT USE OF INSULIN (HCC): ICD-10-CM

## 2024-09-11 DIAGNOSIS — Z12.5 SCREENING FOR MALIGNANT NEOPLASM OF PROSTATE: ICD-10-CM

## 2024-09-11 DIAGNOSIS — E78.2 MIXED HYPERLIPIDEMIA: Chronic | ICD-10-CM

## 2024-09-11 DIAGNOSIS — I10 ESSENTIAL HYPERTENSION, BENIGN: ICD-10-CM

## 2024-09-11 LAB
25(OH)D3 SERPL-MCNC: 36.4 NG/ML
ALBUMIN SERPL-MCNC: 4.3 G/DL (ref 3.4–5)
ALBUMIN/GLOB SERPL: 2 {RATIO} (ref 1.1–2.2)
ALP SERPL-CCNC: 105 U/L (ref 40–129)
ALT SERPL-CCNC: 31 U/L (ref 10–40)
ANION GAP SERPL CALCULATED.3IONS-SCNC: 11 MMOL/L (ref 3–16)
AST SERPL-CCNC: 22 U/L (ref 15–37)
BILIRUB SERPL-MCNC: 0.3 MG/DL (ref 0–1)
BUN SERPL-MCNC: 12 MG/DL (ref 7–20)
CALCIUM SERPL-MCNC: 9.2 MG/DL (ref 8.3–10.6)
CHLORIDE SERPL-SCNC: 100 MMOL/L (ref 99–110)
CHOLEST SERPL-MCNC: 135 MG/DL (ref 0–199)
CO2 SERPL-SCNC: 28 MMOL/L (ref 21–32)
CREAT SERPL-MCNC: 0.9 MG/DL (ref 0.8–1.3)
GFR SERPLBLD CREATININE-BSD FMLA CKD-EPI: >90 ML/MIN/{1.73_M2}
GLUCOSE SERPL-MCNC: 240 MG/DL (ref 70–99)
HDLC SERPL-MCNC: 40 MG/DL (ref 40–60)
LDLC SERPL CALC-MCNC: 42 MG/DL
POTASSIUM SERPL-SCNC: 4.6 MMOL/L (ref 3.5–5.1)
PROT SERPL-MCNC: 6.4 G/DL (ref 6.4–8.2)
PSA SERPL DL<=0.01 NG/ML-MCNC: 0.8 NG/ML (ref 0–4)
SODIUM SERPL-SCNC: 139 MMOL/L (ref 136–145)
TRIGL SERPL-MCNC: 265 MG/DL (ref 0–150)
VLDLC SERPL CALC-MCNC: 53 MG/DL

## 2024-09-11 PROCEDURE — 36415 COLL VENOUS BLD VENIPUNCTURE: CPT

## 2024-09-11 RX ORDER — ROSUVASTATIN CALCIUM 40 MG/1
40 TABLET, COATED ORAL NIGHTLY
Qty: 90 TABLET | Refills: 3 | Status: SHIPPED | OUTPATIENT
Start: 2024-09-11

## 2024-09-16 ASSESSMENT — ENCOUNTER SYMPTOMS
VOMITING: 0
ABDOMINAL PAIN: 0
CHEST TIGHTNESS: 0
CONSTIPATION: 0
BACK PAIN: 0
NAUSEA: 0
TROUBLE SWALLOWING: 0
SORE THROAT: 0
PHOTOPHOBIA: 0
COLOR CHANGE: 0
DIARRHEA: 0
SHORTNESS OF BREATH: 0
EYE ITCHING: 0
EYE PAIN: 0
ABDOMINAL DISTENTION: 0
WHEEZING: 0
COUGH: 0
RHINORRHEA: 0

## 2024-09-23 ENCOUNTER — TELEPHONE (OUTPATIENT)
Dept: ADMINISTRATIVE | Age: 64
End: 2024-09-23

## 2024-10-14 ENCOUNTER — PATIENT MESSAGE (OUTPATIENT)
Dept: FAMILY MEDICINE CLINIC | Age: 64
End: 2024-10-14

## 2024-10-14 DIAGNOSIS — E11.65 TYPE 2 DIABETES MELLITUS WITH HYPERGLYCEMIA, WITH LONG-TERM CURRENT USE OF INSULIN (HCC): ICD-10-CM

## 2024-10-14 DIAGNOSIS — E11.40 TYPE 2 DIABETES MELLITUS WITH DIABETIC NEUROPATHY, WITH LONG-TERM CURRENT USE OF INSULIN (HCC): ICD-10-CM

## 2024-10-14 DIAGNOSIS — Z79.4 TYPE 2 DIABETES MELLITUS WITH HYPERGLYCEMIA, WITH LONG-TERM CURRENT USE OF INSULIN (HCC): ICD-10-CM

## 2024-10-14 DIAGNOSIS — Z79.4 TYPE 2 DIABETES MELLITUS WITH DIABETIC NEUROPATHY, WITH LONG-TERM CURRENT USE OF INSULIN (HCC): ICD-10-CM

## 2024-10-14 RX ORDER — INSULIN GLARGINE-YFGN 100 [IU]/ML
INJECTION, SOLUTION SUBCUTANEOUS
Qty: 45 ML | Refills: 3 | Status: SHIPPED | OUTPATIENT
Start: 2024-10-14

## 2024-10-23 ENCOUNTER — IMMUNIZATION (OUTPATIENT)
Dept: FAMILY MEDICINE CLINIC | Age: 64
End: 2024-10-23
Payer: COMMERCIAL

## 2024-10-23 DIAGNOSIS — Z23 NEED FOR INFLUENZA VACCINATION: Primary | ICD-10-CM

## 2024-10-23 PROCEDURE — 90661 CCIIV3 VAC ABX FR 0.5 ML IM: CPT | Performed by: REGISTERED NURSE

## 2024-10-23 PROCEDURE — 90471 IMMUNIZATION ADMIN: CPT | Performed by: REGISTERED NURSE

## 2024-10-23 NOTE — PROGRESS NOTES
Immunizations Administered       Name Date Dose Route    Influenza, FLUCELVAX, (age 6 mo+) IM, Trivalent PF, 0.5mL 10/23/2024 0.5 mL Intramuscular    Site: Deltoid- Left    Lot: [id number]    NDC: 82324-393-12          Risks and benefits explained.  Current VIS given.  Consent signed.

## 2024-11-11 DIAGNOSIS — Z79.4 TYPE 2 DIABETES MELLITUS WITH DIABETIC NEUROPATHY, WITH LONG-TERM CURRENT USE OF INSULIN (HCC): ICD-10-CM

## 2024-11-11 DIAGNOSIS — E11.40 TYPE 2 DIABETES MELLITUS WITH DIABETIC NEUROPATHY, WITH LONG-TERM CURRENT USE OF INSULIN (HCC): ICD-10-CM

## 2024-11-11 DIAGNOSIS — Z79.4 TYPE 2 DIABETES MELLITUS WITH HYPERGLYCEMIA, WITH LONG-TERM CURRENT USE OF INSULIN (HCC): ICD-10-CM

## 2024-11-11 DIAGNOSIS — E11.65 TYPE 2 DIABETES MELLITUS WITH HYPERGLYCEMIA, WITH LONG-TERM CURRENT USE OF INSULIN (HCC): ICD-10-CM

## 2024-11-12 RX ORDER — PIOGLITAZONEHYDROCHLORIDE 45 MG/1
45 TABLET ORAL DAILY
Qty: 90 TABLET | Refills: 1 | Status: SHIPPED | OUTPATIENT
Start: 2024-11-12

## 2024-12-11 NOTE — PATIENT INSTRUCTIONS
Limit carbs in diet: Sweets, breads, rice, 4P's (potatoes, pizza, pasta, pop). Limit carbohydrates to 200 g daily, 15 to 20 g per snack, 45 to 60 g per meal; Drink at least 64 ounces of water a day, engage in cardiac exercise at least 150 minutes/week, walk more than 7,000 steps daily, sleep between 7 to 9 hours nightly, consume approximately 2,000 calories daily, consume all calories within a 10-hour window daily, try not to eat within 1 to 3 hours of bedtime or within 1 to 3 hours of waking up, limit fatty, fried, and ultra-processed foods in the diet, and limit toxins in the diet (alcohol, drugs, smoking). Consider carb counting.    You may receive a survey regarding the care you received during your visit.  Your input is valuable to us.  We encourage you to complete and return your survey. We hope you will choose us in the future for your healthcare needs.

## 2024-12-12 ENCOUNTER — OFFICE VISIT (OUTPATIENT)
Dept: FAMILY MEDICINE CLINIC | Age: 64
End: 2024-12-12

## 2024-12-12 VITALS
HEART RATE: 85 BPM | WEIGHT: 219 LBS | BODY MASS INDEX: 30.66 KG/M2 | SYSTOLIC BLOOD PRESSURE: 126 MMHG | DIASTOLIC BLOOD PRESSURE: 80 MMHG | OXYGEN SATURATION: 95 % | HEIGHT: 71 IN

## 2024-12-12 DIAGNOSIS — Z79.4 TYPE 2 DIABETES MELLITUS WITH HYPERGLYCEMIA, WITH LONG-TERM CURRENT USE OF INSULIN (HCC): Primary | ICD-10-CM

## 2024-12-12 DIAGNOSIS — E11.65 TYPE 2 DIABETES MELLITUS WITH HYPERGLYCEMIA, WITH LONG-TERM CURRENT USE OF INSULIN (HCC): Primary | ICD-10-CM

## 2024-12-12 LAB
CREAT UR-MCNC: 78.8 MG/DL (ref 39–259)
HBA1C MFR BLD: 8.3 %
MICROALBUMIN UR DL<=1MG/L-MCNC: <1.2 MG/DL
MICROALBUMIN/CREAT UR: NORMAL MG/G (ref 0–30)

## 2024-12-12 ASSESSMENT — ENCOUNTER SYMPTOMS
DIARRHEA: 0
SHORTNESS OF BREATH: 0
CHEST TIGHTNESS: 0
ABDOMINAL PAIN: 0
COUGH: 0
ABDOMINAL DISTENTION: 0
VOMITING: 0
NAUSEA: 0
WHEEZING: 0
CONSTIPATION: 0
PHOTOPHOBIA: 0
COLOR CHANGE: 0

## 2024-12-12 NOTE — ASSESSMENT & PLAN NOTE
Chronic, not at goal (unstable), consistent 8.3%  -Treatment options discussed.  Declines Januvia  -Recommend initiating 20 units of insulin every morning as well as the 80 units of insulin nightly.  Patient agreeable to plan  -Strongly recommended elevating pop, increasing intentional physical activity  -Limit carbs in diet: Sweets, breads, rice, 4P's (potatoes, pizza, pasta, pop). Limit carbohydrates to 200 g daily, 15 to 20 g per snack, 45 to 60 g per meal; Drink at least 64 ounces of water a day, engage in cardiac exercise at least 150 minutes/week, walk more than 7,000 steps daily, sleep between 7 to 9 hours nightly, consume approximately 2,000 calories daily, consume all calories within a 10-hour window daily, try not to eat within 1 to 3 hours of bedtime or within 1 to 3 hours of waking up, limit fatty, fried, and ultra-processed foods in the diet, and limit toxins in the diet (alcohol, drugs, smoking). Consider carb counting.  -Follow-up in 3 months  Orders:    POCT glycosylated hemoglobin (Hb A1C)    Microalbumin / Creatinine Urine Ratio; Future     Statement Selected

## 2025-05-21 ENCOUNTER — TELEPHONE (OUTPATIENT)
Dept: FAMILY MEDICINE CLINIC | Age: 65
End: 2025-05-21

## 2025-05-21 DIAGNOSIS — E11.40 TYPE 2 DIABETES MELLITUS WITH DIABETIC NEUROPATHY, WITH LONG-TERM CURRENT USE OF INSULIN (HCC): ICD-10-CM

## 2025-05-21 DIAGNOSIS — Z79.4 TYPE 2 DIABETES MELLITUS WITH HYPERGLYCEMIA, WITH LONG-TERM CURRENT USE OF INSULIN (HCC): ICD-10-CM

## 2025-05-21 DIAGNOSIS — E11.65 TYPE 2 DIABETES MELLITUS WITH HYPERGLYCEMIA, WITH LONG-TERM CURRENT USE OF INSULIN (HCC): ICD-10-CM

## 2025-05-21 DIAGNOSIS — Z79.4 TYPE 2 DIABETES MELLITUS WITH DIABETIC NEUROPATHY, WITH LONG-TERM CURRENT USE OF INSULIN (HCC): ICD-10-CM

## 2025-05-21 RX ORDER — INSULIN GLARGINE-YFGN 100 [IU]/ML
INJECTION, SOLUTION SUBCUTANEOUS
Qty: 45 ML | Refills: 3 | Status: SHIPPED | OUTPATIENT
Start: 2025-05-21

## 2025-05-21 NOTE — TELEPHONE ENCOUNTER
Patient called in stating he is going to be out of his insulin by the end of the week. He would like this to be refilled to last him until his appointment on 6/10 with Dr. Ramos.    INSULIN GLARGINE-YFGN- 100 UNIT- INJECT 80 UNITS INTO THE SKIN AT BEDTIME AND 20 UNITS DAILY- 45ML- 3 REFILLS.    Please give him a call back.     [facility]  [email or web address]  Phone no.  [phone]

## 2025-06-09 ASSESSMENT — PATIENT HEALTH QUESTIONNAIRE - PHQ9
SUM OF ALL RESPONSES TO PHQ QUESTIONS 1-9: 9
9. THOUGHTS THAT YOU WOULD BE BETTER OFF DEAD, OR OF HURTING YOURSELF: NOT AT ALL
SUM OF ALL RESPONSES TO PHQ QUESTIONS 1-9: 9
7. TROUBLE CONCENTRATING ON THINGS, SUCH AS READING THE NEWSPAPER OR WATCHING TELEVISION: SEVERAL DAYS
10. IF YOU CHECKED OFF ANY PROBLEMS, HOW DIFFICULT HAVE THESE PROBLEMS MADE IT FOR YOU TO DO YOUR WORK, TAKE CARE OF THINGS AT HOME, OR GET ALONG WITH OTHER PEOPLE: SOMEWHAT DIFFICULT
6. FEELING BAD ABOUT YOURSELF - OR THAT YOU ARE A FAILURE OR HAVE LET YOURSELF OR YOUR FAMILY DOWN: SEVERAL DAYS
6. FEELING BAD ABOUT YOURSELF - OR THAT YOU ARE A FAILURE OR HAVE LET YOURSELF OR YOUR FAMILY DOWN: SEVERAL DAYS
3. TROUBLE FALLING OR STAYING ASLEEP: SEVERAL DAYS
SUM OF ALL RESPONSES TO PHQ QUESTIONS 1-9: 9
9. THOUGHTS THAT YOU WOULD BE BETTER OFF DEAD, OR OF HURTING YOURSELF: NOT AT ALL
5. POOR APPETITE OR OVEREATING: SEVERAL DAYS
10. IF YOU CHECKED OFF ANY PROBLEMS, HOW DIFFICULT HAVE THESE PROBLEMS MADE IT FOR YOU TO DO YOUR WORK, TAKE CARE OF THINGS AT HOME, OR GET ALONG WITH OTHER PEOPLE: SOMEWHAT DIFFICULT
3. TROUBLE FALLING OR STAYING ASLEEP: SEVERAL DAYS
4. FEELING TIRED OR HAVING LITTLE ENERGY: SEVERAL DAYS
7. TROUBLE CONCENTRATING ON THINGS, SUCH AS READING THE NEWSPAPER OR WATCHING TELEVISION: SEVERAL DAYS
5. POOR APPETITE OR OVEREATING: SEVERAL DAYS
8. MOVING OR SPEAKING SO SLOWLY THAT OTHER PEOPLE COULD HAVE NOTICED. OR THE OPPOSITE - BEING SO FIDGETY OR RESTLESS THAT YOU HAVE BEEN MOVING AROUND A LOT MORE THAN USUAL: NOT AT ALL
2. FEELING DOWN, DEPRESSED OR HOPELESS: MORE THAN HALF THE DAYS
2. FEELING DOWN, DEPRESSED OR HOPELESS: MORE THAN HALF THE DAYS
SUM OF ALL RESPONSES TO PHQ QUESTIONS 1-9: 9
SUM OF ALL RESPONSES TO PHQ QUESTIONS 1-9: 9
8. MOVING OR SPEAKING SO SLOWLY THAT OTHER PEOPLE COULD HAVE NOTICED. OR THE OPPOSITE, BEING SO FIGETY OR RESTLESS THAT YOU HAVE BEEN MOVING AROUND A LOT MORE THAN USUAL: NOT AT ALL
1. LITTLE INTEREST OR PLEASURE IN DOING THINGS: MORE THAN HALF THE DAYS
4. FEELING TIRED OR HAVING LITTLE ENERGY: SEVERAL DAYS
1. LITTLE INTEREST OR PLEASURE IN DOING THINGS: MORE THAN HALF THE DAYS

## 2025-06-10 ENCOUNTER — OFFICE VISIT (OUTPATIENT)
Dept: FAMILY MEDICINE CLINIC | Age: 65
End: 2025-06-10

## 2025-06-10 VITALS
BODY MASS INDEX: 29.12 KG/M2 | DIASTOLIC BLOOD PRESSURE: 80 MMHG | HEIGHT: 71 IN | HEART RATE: 90 BPM | OXYGEN SATURATION: 95 % | WEIGHT: 208 LBS | SYSTOLIC BLOOD PRESSURE: 120 MMHG

## 2025-06-10 DIAGNOSIS — E11.40 TYPE 2 DIABETES MELLITUS WITH DIABETIC NEUROPATHY, WITH LONG-TERM CURRENT USE OF INSULIN (HCC): Primary | ICD-10-CM

## 2025-06-10 DIAGNOSIS — Z79.4 TYPE 2 DIABETES MELLITUS WITH DIABETIC NEUROPATHY, WITH LONG-TERM CURRENT USE OF INSULIN (HCC): Primary | ICD-10-CM

## 2025-06-10 LAB — HBA1C MFR BLD: 8.4 %

## 2025-06-10 RX ORDER — INSULIN GLARGINE-YFGN 100 [IU]/ML
INJECTION, SOLUTION SUBCUTANEOUS
Qty: 45 ML | Refills: 3 | Status: SHIPPED | OUTPATIENT
Start: 2025-06-10 | End: 2025-06-10

## 2025-06-10 RX ORDER — GLIPIZIDE 5 MG/1
5 TABLET, FILM COATED, EXTENDED RELEASE ORAL DAILY
Qty: 90 TABLET | Refills: 1 | Status: SHIPPED | OUTPATIENT
Start: 2025-06-10

## 2025-06-10 RX ORDER — ACYCLOVIR 800 MG/1
1 TABLET ORAL CONTINUOUS
Qty: 1 EACH | Refills: 5 | Status: SHIPPED | OUTPATIENT
Start: 2025-06-10

## 2025-06-10 RX ORDER — INSULIN LISPRO 100 [IU]/ML
10 INJECTION, SOLUTION INTRAVENOUS; SUBCUTANEOUS
Qty: 15 ML | Refills: 3 | Status: SHIPPED | OUTPATIENT
Start: 2025-06-10

## 2025-06-10 RX ORDER — KETOROLAC TROMETHAMINE 30 MG/ML
1 INJECTION, SOLUTION INTRAMUSCULAR; INTRAVENOUS CONTINUOUS
Qty: 1 EACH | Refills: 0 | Status: SHIPPED | OUTPATIENT
Start: 2025-06-10

## 2025-06-10 RX ORDER — INSULIN GLARGINE-YFGN 100 [IU]/ML
80 INJECTION, SOLUTION SUBCUTANEOUS NIGHTLY
Qty: 45 ML | Refills: 3 | Status: SHIPPED | OUTPATIENT
Start: 2025-06-10

## 2025-06-10 SDOH — ECONOMIC STABILITY: FOOD INSECURITY: WITHIN THE PAST 12 MONTHS, THE FOOD YOU BOUGHT JUST DIDN'T LAST AND YOU DIDN'T HAVE MONEY TO GET MORE.: NEVER TRUE

## 2025-06-10 SDOH — ECONOMIC STABILITY: FOOD INSECURITY: WITHIN THE PAST 12 MONTHS, YOU WORRIED THAT YOUR FOOD WOULD RUN OUT BEFORE YOU GOT MONEY TO BUY MORE.: NEVER TRUE

## 2025-06-10 NOTE — PROGRESS NOTES
Family Medicine Clerkship M3 Note:    Edgar is a 65 y.o. male with a past medical history of depression, anxiety, HTN, KELSI, DM-II who presents to the clinic for a diabetes follow-up.     HPI:   The patient states he is doing well. He is out of insulin and needs a refill today. He measures his blood sugar at home, in the mornings he is 120-180. Denies any hypoglycemic events or severe hyperglycemic episodes. He reports good adherence to his medications and insulin. He has trying to exercise and has last 10 lbs over the past 6 months. He is due for an eye exam and has not seen one recently. In regards to his feet, the patient states he is doing well but describes tingling and numbness when his sugars are high.     The patient believes his diet is the main problem he faces with his glycemic control. He reports eating a lot of breads, pastas, potatoes.     Interim History:  - 12/12/24:  visit with Franck - A1C = 8.3%, not interested in starting Januvia. Insulin increased to 20u qAM and 80u qPM.   - DM-II: UACR 12/2024: minimal proteinuria. Retinal exam (2021), Foot exam (8/1/24): showed open wound which was redressed and oral Abx rx'd.    - Current Regimine: glargin 20u qAM, 80qPM, Actos 45mg, metformin 2000, farxiga 10mg, rosuvastatin 40  - previously tried Mounjaro with very bad SE's and Trulicity less severe SE's.     Past Medical History:  Past Medical History:   Diagnosis Date    Anxiety     Bipolar disorder ([facility])     Depression     Diabetes mellitus ([facility])     type II    Hypertension     Neuropathy     Restless legs syndrome     Sleep apnea     Type 2 diabetes mellitus without complication ([facility])          Medications:  Outpatient Encounter Medications as of 6/10/2025   Medication Sig Dispense Refill    Insulin Glargine-yfgn (SEMGLEE, YFGN,) 100 UNIT/ML SOPN Inject 80 Units into the skin at bedtime AND 20 Units daily. 45 mL 3    omeprazole (PRILOSEC) 20 MG delayed release capsule TAKE 1 CAPSULE DAILY 90 capsule 1 
and satisfaction.    I spent 43 minutes face-to-face with the patient, over half in discussion of the diagnosis and the importance of compliance with the treatment plan.      Jose Daniel Ramos MD  6/10/2025 10:08 AM

## 2025-06-10 NOTE — ASSESSMENT & PLAN NOTE
Chronic, not at goal (unstable), A1c was not at goal with value of 8.4% as of 6/10/2025 despite multiple regimen including the high-dose long acting insulin. Agreeable with short-acting insulin therapy given consistently elevated A1c above the therapeutic goal. Decrease Semglee to 80 units nightly only without further daytime dose; instead, start Lispro 10 units TID with meals. Increase by 1 unit if post-prandial glucose is > 180 for 3 meals in a row. Also start Glipizide XR 5 mg daily. Continue other regimen in the mean time. Would highly benefit from the CGM use especially now starting the insulin regimen that requires the use of it for 4 times daily. Abraham 3 supply ordered. Referral to Podiatry ordered due to advancing neuropathy of both feet. Follow-up in 3 months.

## 2025-06-16 DIAGNOSIS — F41.1 GENERALIZED ANXIETY DISORDER: ICD-10-CM

## 2025-06-16 RX ORDER — BUSPIRONE HYDROCHLORIDE 10 MG/1
10 TABLET ORAL 3 TIMES DAILY
Qty: 270 TABLET | Refills: 3 | OUTPATIENT
Start: 2025-06-16

## 2025-06-17 ENCOUNTER — LAB (OUTPATIENT)
Dept: FAMILY MEDICINE CLINIC | Age: 65
End: 2025-06-17

## 2025-06-17 DIAGNOSIS — Z79.4 TYPE 2 DIABETES MELLITUS WITH DIABETIC NEUROPATHY, WITH LONG-TERM CURRENT USE OF INSULIN (HCC): ICD-10-CM

## 2025-06-17 DIAGNOSIS — E11.40 TYPE 2 DIABETES MELLITUS WITH DIABETIC NEUROPATHY, WITH LONG-TERM CURRENT USE OF INSULIN (HCC): ICD-10-CM

## 2025-06-17 RX ORDER — ACYCLOVIR 800 MG/1
1 TABLET ORAL CONTINUOUS
Qty: 6 EACH | Refills: 5 | Status: SHIPPED | OUTPATIENT
Start: 2025-06-17

## 2025-06-25 ENCOUNTER — TELEPHONE (OUTPATIENT)
Dept: FAMILY MEDICINE CLINIC | Age: 65
End: 2025-06-25

## 2025-06-25 DIAGNOSIS — Z79.4 TYPE 2 DIABETES MELLITUS WITH DIABETIC NEUROPATHY, WITH LONG-TERM CURRENT USE OF INSULIN (HCC): ICD-10-CM

## 2025-06-25 DIAGNOSIS — E11.40 TYPE 2 DIABETES MELLITUS WITH DIABETIC NEUROPATHY, WITH LONG-TERM CURRENT USE OF INSULIN (HCC): ICD-10-CM

## 2025-06-25 RX ORDER — ACYCLOVIR 800 MG/1
1 TABLET ORAL CONTINUOUS
Qty: 6 EACH | Refills: 5 | Status: SHIPPED | OUTPATIENT
Start: 2025-06-25

## 2025-06-25 NOTE — TELEPHONE ENCOUNTER
[facility] needs clarification on the medication FREESTYLE MATTHEW 3 SENSOR - THEY NEED CLARIFICATION ON THE DIRECTIONS. PLEASE GIVE THEM A CALL BACK.     [facility] - [address]  phone no. [phone]

## 2025-07-09 DIAGNOSIS — Z79.4 TYPE 2 DIABETES MELLITUS WITH DIABETIC NEUROPATHY, WITH LONG-TERM CURRENT USE OF INSULIN (HCC): ICD-10-CM

## 2025-07-09 DIAGNOSIS — Z79.4 TYPE 2 DIABETES MELLITUS WITH HYPERGLYCEMIA, WITH LONG-TERM CURRENT USE OF INSULIN (HCC): ICD-10-CM

## 2025-07-09 DIAGNOSIS — I10 ESSENTIAL HYPERTENSION, BENIGN: ICD-10-CM

## 2025-07-09 DIAGNOSIS — F41.1 GENERALIZED ANXIETY DISORDER: ICD-10-CM

## 2025-07-09 DIAGNOSIS — E11.65 TYPE 2 DIABETES MELLITUS WITH HYPERGLYCEMIA, WITH LONG-TERM CURRENT USE OF INSULIN (HCC): ICD-10-CM

## 2025-07-09 DIAGNOSIS — E11.40 TYPE 2 DIABETES MELLITUS WITH DIABETIC NEUROPATHY, WITH LONG-TERM CURRENT USE OF INSULIN (HCC): ICD-10-CM

## 2025-07-09 DIAGNOSIS — F32.5 MAJOR DEPRESSION IN REMISSION: ICD-10-CM

## 2025-07-10 RX ORDER — OMEPRAZOLE 20 MG/1
20 CAPSULE, DELAYED RELEASE ORAL DAILY
Qty: 90 CAPSULE | Refills: 1 | Status: SHIPPED | OUTPATIENT
Start: 2025-07-10

## 2025-07-10 RX ORDER — GLIPIZIDE 5 MG/1
5 TABLET, FILM COATED, EXTENDED RELEASE ORAL DAILY
Qty: 90 TABLET | Refills: 1 | Status: SHIPPED | OUTPATIENT
Start: 2025-07-10

## 2025-07-10 RX ORDER — PAROXETINE 30 MG/1
TABLET, FILM COATED ORAL
Qty: 180 TABLET | Refills: 1 | Status: SHIPPED | OUTPATIENT
Start: 2025-07-10

## 2025-07-10 RX ORDER — ICOSAPENT ETHYL 1 G/1
2 CAPSULE ORAL 2 TIMES DAILY
Qty: 360 CAPSULE | Refills: 1 | Status: SHIPPED | OUTPATIENT
Start: 2025-07-10

## 2025-07-10 RX ORDER — PROPRANOLOL HYDROCHLORIDE 60 MG/1
CAPSULE, EXTENDED RELEASE ORAL
Qty: 90 CAPSULE | Refills: 1 | Status: SHIPPED | OUTPATIENT
Start: 2025-07-10

## 2025-07-10 RX ORDER — METFORMIN HYDROCHLORIDE 500 MG/1
TABLET, EXTENDED RELEASE ORAL
Qty: 360 TABLET | Refills: 1 | Status: SHIPPED | OUTPATIENT
Start: 2025-07-10

## 2025-07-10 RX ORDER — PIOGLITAZONE 45 MG/1
45 TABLET ORAL DAILY
Qty: 90 TABLET | Refills: 1 | Status: SHIPPED | OUTPATIENT
Start: 2025-07-10

## 2025-07-10 RX ORDER — DAPAGLIFLOZIN 10 MG/1
10 TABLET, FILM COATED ORAL EVERY MORNING
Qty: 90 TABLET | Refills: 1 | Status: SHIPPED | OUTPATIENT
Start: 2025-07-10

## 2025-07-10 RX ORDER — ROSUVASTATIN CALCIUM 40 MG/1
40 TABLET, COATED ORAL NIGHTLY
Qty: 90 TABLET | Refills: 1 | Status: SHIPPED | OUTPATIENT
Start: 2025-07-10

## 2025-07-10 RX ORDER — IRBESARTAN 150 MG/1
150 TABLET ORAL NIGHTLY
Qty: 90 TABLET | Refills: 1 | Status: SHIPPED | OUTPATIENT
Start: 2025-07-10